# Patient Record
Sex: MALE | Race: ASIAN | NOT HISPANIC OR LATINO | URBAN - METROPOLITAN AREA
[De-identification: names, ages, dates, MRNs, and addresses within clinical notes are randomized per-mention and may not be internally consistent; named-entity substitution may affect disease eponyms.]

---

## 2023-05-07 ENCOUNTER — INPATIENT (INPATIENT)
Facility: HOSPITAL | Age: 43
LOS: 4 days | Discharge: LEFT BEFORE DISCHARGE | DRG: 439 | End: 2023-05-12
Attending: INTERNAL MEDICINE | Admitting: INTERNAL MEDICINE
Payer: MEDICAID

## 2023-05-07 VITALS
OXYGEN SATURATION: 100 % | HEART RATE: 95 BPM | RESPIRATION RATE: 16 BRPM | TEMPERATURE: 98 F | SYSTOLIC BLOOD PRESSURE: 71 MMHG | DIASTOLIC BLOOD PRESSURE: 46 MMHG | WEIGHT: 154.1 LBS | HEIGHT: 72 IN

## 2023-05-07 DIAGNOSIS — N17.9 ACUTE KIDNEY FAILURE, UNSPECIFIED: ICD-10-CM

## 2023-05-07 DIAGNOSIS — Z29.9 ENCOUNTER FOR PROPHYLACTIC MEASURES, UNSPECIFIED: ICD-10-CM

## 2023-05-07 DIAGNOSIS — E87.29 OTHER ACIDOSIS: ICD-10-CM

## 2023-05-07 DIAGNOSIS — F10.10 ALCOHOL ABUSE, UNCOMPLICATED: ICD-10-CM

## 2023-05-07 DIAGNOSIS — K85.20 ALCOHOL INDUCED ACUTE PANCREATITIS WITHOUT NECROSIS OR INFECTION: ICD-10-CM

## 2023-05-07 DIAGNOSIS — E88.89 OTHER SPECIFIED METABOLIC DISORDERS: ICD-10-CM

## 2023-05-07 LAB
ALBUMIN SERPL ELPH-MCNC: 3.4 G/DL — LOW (ref 3.5–5)
ALBUMIN SERPL ELPH-MCNC: 3.5 G/DL — SIGNIFICANT CHANGE UP (ref 3.5–5)
ALBUMIN SERPL ELPH-MCNC: 4.4 G/DL — SIGNIFICANT CHANGE UP (ref 3.5–5)
ALP SERPL-CCNC: 77 U/L — SIGNIFICANT CHANGE UP (ref 40–120)
ALP SERPL-CCNC: 79 U/L — SIGNIFICANT CHANGE UP (ref 40–120)
ALP SERPL-CCNC: 98 U/L — SIGNIFICANT CHANGE UP (ref 40–120)
ALT FLD-CCNC: 47 U/L DA — SIGNIFICANT CHANGE UP (ref 10–60)
ALT FLD-CCNC: 47 U/L DA — SIGNIFICANT CHANGE UP (ref 10–60)
ALT FLD-CCNC: 59 U/L DA — SIGNIFICANT CHANGE UP (ref 10–60)
AMPHET UR-MCNC: NEGATIVE — SIGNIFICANT CHANGE UP
ANION GAP SERPL CALC-SCNC: 18 MMOL/L — HIGH (ref 5–17)
ANION GAP SERPL CALC-SCNC: 27 MMOL/L — HIGH (ref 5–17)
ANION GAP SERPL CALC-SCNC: 30 MMOL/L — HIGH (ref 5–17)
APPEARANCE UR: ABNORMAL
AST SERPL-CCNC: 73 U/L — HIGH (ref 10–40)
AST SERPL-CCNC: 78 U/L — HIGH (ref 10–40)
AST SERPL-CCNC: 90 U/L — HIGH (ref 10–40)
BACTERIA # UR AUTO: ABNORMAL /HPF
BARBITURATES UR SCN-MCNC: NEGATIVE — SIGNIFICANT CHANGE UP
BASE EXCESS BLDV CALC-SCNC: SIGNIFICANT CHANGE UP MMOL/L
BASOPHILS # BLD AUTO: 0.01 K/UL — SIGNIFICANT CHANGE UP (ref 0–0.2)
BASOPHILS # BLD AUTO: 0.02 K/UL — SIGNIFICANT CHANGE UP (ref 0–0.2)
BASOPHILS NFR BLD AUTO: 0.1 % — SIGNIFICANT CHANGE UP (ref 0–2)
BASOPHILS NFR BLD AUTO: 0.2 % — SIGNIFICANT CHANGE UP (ref 0–2)
BENZODIAZ UR-MCNC: NEGATIVE — SIGNIFICANT CHANGE UP
BILIRUB SERPL-MCNC: 0.6 MG/DL — SIGNIFICANT CHANGE UP (ref 0.2–1.2)
BILIRUB SERPL-MCNC: 0.7 MG/DL — SIGNIFICANT CHANGE UP (ref 0.2–1.2)
BILIRUB SERPL-MCNC: 1 MG/DL — SIGNIFICANT CHANGE UP (ref 0.2–1.2)
BILIRUB UR-MCNC: NEGATIVE — SIGNIFICANT CHANGE UP
BUN SERPL-MCNC: 21 MG/DL — HIGH (ref 7–18)
BUN SERPL-MCNC: 24 MG/DL — HIGH (ref 7–18)
BUN SERPL-MCNC: 24 MG/DL — HIGH (ref 7–18)
CALCIUM SERPL-MCNC: 7.8 MG/DL — LOW (ref 8.4–10.5)
CALCIUM SERPL-MCNC: 8.1 MG/DL — LOW (ref 8.4–10.5)
CALCIUM SERPL-MCNC: 8.5 MG/DL — SIGNIFICANT CHANGE UP (ref 8.4–10.5)
CHLORIDE SERPL-SCNC: 101 MMOL/L — SIGNIFICANT CHANGE UP (ref 96–108)
CHLORIDE SERPL-SCNC: 91 MMOL/L — LOW (ref 96–108)
CHLORIDE SERPL-SCNC: 97 MMOL/L — SIGNIFICANT CHANGE UP (ref 96–108)
CK MB BLD-MCNC: 3.2 % — SIGNIFICANT CHANGE UP (ref 0–3.5)
CK MB CFR SERPL CALC: 3.8 NG/ML — HIGH (ref 0–3.6)
CK SERPL-CCNC: 120 U/L — SIGNIFICANT CHANGE UP (ref 35–232)
CO2 SERPL-SCNC: 14 MMOL/L — LOW (ref 22–31)
CO2 SERPL-SCNC: 9 MMOL/L — CRITICAL LOW (ref 22–31)
CO2 SERPL-SCNC: 9 MMOL/L — CRITICAL LOW (ref 22–31)
COCAINE METAB.OTHER UR-MCNC: NEGATIVE — SIGNIFICANT CHANGE UP
COLOR SPEC: YELLOW — SIGNIFICANT CHANGE UP
CREAT ?TM UR-MCNC: 62 MG/DL — SIGNIFICANT CHANGE UP
CREAT SERPL-MCNC: 1.47 MG/DL — HIGH (ref 0.5–1.3)
CREAT SERPL-MCNC: 1.94 MG/DL — HIGH (ref 0.5–1.3)
CREAT SERPL-MCNC: 2.85 MG/DL — HIGH (ref 0.5–1.3)
DIFF PNL FLD: ABNORMAL
EGFR: 27 ML/MIN/1.73M2 — LOW
EGFR: 44 ML/MIN/1.73M2 — LOW
EGFR: 61 ML/MIN/1.73M2 — SIGNIFICANT CHANGE UP
EOSINOPHIL # BLD AUTO: 0 K/UL — SIGNIFICANT CHANGE UP (ref 0–0.5)
EOSINOPHIL # BLD AUTO: 0 K/UL — SIGNIFICANT CHANGE UP (ref 0–0.5)
EOSINOPHIL NFR BLD AUTO: 0 % — SIGNIFICANT CHANGE UP (ref 0–6)
EOSINOPHIL NFR BLD AUTO: 0 % — SIGNIFICANT CHANGE UP (ref 0–6)
EPI CELLS # UR: SIGNIFICANT CHANGE UP /HPF
ETHANOL SERPL-MCNC: 229 MG/DL — HIGH (ref 0–10)
GAS PNL BLDA: SIGNIFICANT CHANGE UP
GAS PNL BLDA: SIGNIFICANT CHANGE UP
GLUCOSE BLDC GLUCOMTR-MCNC: 75 MG/DL — SIGNIFICANT CHANGE UP (ref 70–99)
GLUCOSE SERPL-MCNC: 109 MG/DL — HIGH (ref 70–99)
GLUCOSE SERPL-MCNC: 113 MG/DL — HIGH (ref 70–99)
GLUCOSE SERPL-MCNC: 75 MG/DL — SIGNIFICANT CHANGE UP (ref 70–99)
GLUCOSE UR QL: NEGATIVE — SIGNIFICANT CHANGE UP
HCO3 BLDV-SCNC: SIGNIFICANT CHANGE UP MMOL/L (ref 22–29)
HCT VFR BLD CALC: 36.7 % — LOW (ref 39–50)
HCT VFR BLD CALC: 42.8 % — SIGNIFICANT CHANGE UP (ref 39–50)
HGB BLD-MCNC: 12 G/DL — LOW (ref 13–17)
HGB BLD-MCNC: 13.9 G/DL — SIGNIFICANT CHANGE UP (ref 13–17)
IMM GRANULOCYTES NFR BLD AUTO: 0.5 % — SIGNIFICANT CHANGE UP (ref 0–0.9)
IMM GRANULOCYTES NFR BLD AUTO: 0.6 % — SIGNIFICANT CHANGE UP (ref 0–0.9)
KETONES UR-MCNC: ABNORMAL
LACTATE SERPL-SCNC: 4.7 MMOL/L — CRITICAL HIGH (ref 0.7–2)
LACTATE SERPL-SCNC: 5.3 MMOL/L — CRITICAL HIGH (ref 0.7–2)
LACTATE SERPL-SCNC: 7.3 MMOL/L — CRITICAL HIGH (ref 0.7–2)
LEUKOCYTE ESTERASE UR-ACNC: NEGATIVE — SIGNIFICANT CHANGE UP
LIDOCAIN IGE QN: HIGH U/L (ref 73–393)
LYMPHOCYTES # BLD AUTO: 0.26 K/UL — LOW (ref 1–3.3)
LYMPHOCYTES # BLD AUTO: 0.36 K/UL — LOW (ref 1–3.3)
LYMPHOCYTES # BLD AUTO: 2.1 % — LOW (ref 13–44)
LYMPHOCYTES # BLD AUTO: 3 % — LOW (ref 13–44)
MAGNESIUM SERPL-MCNC: 1.6 MG/DL — SIGNIFICANT CHANGE UP (ref 1.6–2.6)
MCHC RBC-ENTMCNC: 28.9 PG — SIGNIFICANT CHANGE UP (ref 27–34)
MCHC RBC-ENTMCNC: 29.1 PG — SIGNIFICANT CHANGE UP (ref 27–34)
MCHC RBC-ENTMCNC: 32.5 GM/DL — SIGNIFICANT CHANGE UP (ref 32–36)
MCHC RBC-ENTMCNC: 32.7 GM/DL — SIGNIFICANT CHANGE UP (ref 32–36)
MCV RBC AUTO: 88.4 FL — SIGNIFICANT CHANGE UP (ref 80–100)
MCV RBC AUTO: 89.7 FL — SIGNIFICANT CHANGE UP (ref 80–100)
METHADONE UR-MCNC: NEGATIVE — SIGNIFICANT CHANGE UP
MONOCYTES # BLD AUTO: 0.53 K/UL — SIGNIFICANT CHANGE UP (ref 0–0.9)
MONOCYTES # BLD AUTO: 0.98 K/UL — HIGH (ref 0–0.9)
MONOCYTES NFR BLD AUTO: 4.3 % — SIGNIFICANT CHANGE UP (ref 2–14)
MONOCYTES NFR BLD AUTO: 8.1 % — SIGNIFICANT CHANGE UP (ref 2–14)
NEUTROPHILS # BLD AUTO: 10.65 K/UL — HIGH (ref 1.8–7.4)
NEUTROPHILS # BLD AUTO: 11.38 K/UL — HIGH (ref 1.8–7.4)
NEUTROPHILS NFR BLD AUTO: 88.1 % — HIGH (ref 43–77)
NEUTROPHILS NFR BLD AUTO: 93 % — HIGH (ref 43–77)
NITRITE UR-MCNC: NEGATIVE — SIGNIFICANT CHANGE UP
NRBC # BLD: 0 /100 WBCS — SIGNIFICANT CHANGE UP (ref 0–0)
NRBC # BLD: 0 /100 WBCS — SIGNIFICANT CHANGE UP (ref 0–0)
OPIATES UR-MCNC: NEGATIVE — SIGNIFICANT CHANGE UP
OSMOLALITY SERPL: 326 MOSMOL/KG — HIGH (ref 275–300)
PCO2 BLDV: 30 MMHG — LOW (ref 42–55)
PCP SPEC-MCNC: SIGNIFICANT CHANGE UP
PCP SPEC-MCNC: SIGNIFICANT CHANGE UP
PCP UR-MCNC: NEGATIVE — SIGNIFICANT CHANGE UP
PH BLDV: <7 — SIGNIFICANT CHANGE UP (ref 7.32–7.43)
PH UR: 6 — SIGNIFICANT CHANGE UP (ref 5–8)
PHOSPHATE SERPL-MCNC: 1.2 MG/DL — LOW (ref 2.5–4.5)
PLATELET # BLD AUTO: 133 K/UL — LOW (ref 150–400)
PLATELET # BLD AUTO: 89 K/UL — LOW (ref 150–400)
PO2 BLDV: 48 MMHG — SIGNIFICANT CHANGE UP
POTASSIUM SERPL-MCNC: 4 MMOL/L — SIGNIFICANT CHANGE UP (ref 3.5–5.3)
POTASSIUM SERPL-MCNC: 4.4 MMOL/L — SIGNIFICANT CHANGE UP (ref 3.5–5.3)
POTASSIUM SERPL-MCNC: 4.5 MMOL/L — SIGNIFICANT CHANGE UP (ref 3.5–5.3)
POTASSIUM SERPL-SCNC: 4 MMOL/L — SIGNIFICANT CHANGE UP (ref 3.5–5.3)
POTASSIUM SERPL-SCNC: 4.4 MMOL/L — SIGNIFICANT CHANGE UP (ref 3.5–5.3)
POTASSIUM SERPL-SCNC: 4.5 MMOL/L — SIGNIFICANT CHANGE UP (ref 3.5–5.3)
PROT SERPL-MCNC: 6.4 G/DL — SIGNIFICANT CHANGE UP (ref 6–8.3)
PROT SERPL-MCNC: 6.4 G/DL — SIGNIFICANT CHANGE UP (ref 6–8.3)
PROT SERPL-MCNC: 8 G/DL — SIGNIFICANT CHANGE UP (ref 6–8.3)
PROT UR-MCNC: 30 MG/DL
RBC # BLD: 4.15 M/UL — LOW (ref 4.2–5.8)
RBC # BLD: 4.77 M/UL — SIGNIFICANT CHANGE UP (ref 4.2–5.8)
RBC # FLD: 17.8 % — HIGH (ref 10.3–14.5)
RBC # FLD: 18 % — HIGH (ref 10.3–14.5)
RBC CASTS # UR COMP ASSIST: ABNORMAL /HPF (ref 0–2)
SAO2 % BLDV: 74.5 % — SIGNIFICANT CHANGE UP
SODIUM SERPL-SCNC: 130 MMOL/L — LOW (ref 135–145)
SODIUM SERPL-SCNC: 133 MMOL/L — LOW (ref 135–145)
SODIUM SERPL-SCNC: 133 MMOL/L — LOW (ref 135–145)
SODIUM UR-SCNC: 45 MMOL/L — SIGNIFICANT CHANGE UP
SP GR SPEC: 1.02 — SIGNIFICANT CHANGE UP (ref 1.01–1.02)
THC UR QL: NEGATIVE — SIGNIFICANT CHANGE UP
TRIGL SERPL-MCNC: 224 MG/DL — HIGH
TROPONIN I, HIGH SENSITIVITY RESULT: 8.6 NG/L — SIGNIFICANT CHANGE UP
UROBILINOGEN FLD QL: NEGATIVE — SIGNIFICANT CHANGE UP
WBC # BLD: 12.24 K/UL — HIGH (ref 3.8–10.5)
WBC # BLD: 12.24 K/UL — HIGH (ref 3.8–10.5)
WBC # FLD AUTO: 12.24 K/UL — HIGH (ref 3.8–10.5)
WBC # FLD AUTO: 12.24 K/UL — HIGH (ref 3.8–10.5)
WBC UR QL: SIGNIFICANT CHANGE UP /HPF (ref 0–5)

## 2023-05-07 PROCEDURE — 74176 CT ABD & PELVIS W/O CONTRAST: CPT | Mod: 26

## 2023-05-07 PROCEDURE — 71045 X-RAY EXAM CHEST 1 VIEW: CPT | Mod: 26

## 2023-05-07 PROCEDURE — 99291 CRITICAL CARE FIRST HOUR: CPT

## 2023-05-07 RX ORDER — THIAMINE MONONITRATE (VIT B1) 100 MG
500 TABLET ORAL DAILY
Refills: 0 | Status: COMPLETED | OUTPATIENT
Start: 2023-05-07 | End: 2023-05-10

## 2023-05-07 RX ORDER — FENTANYL CITRATE 50 UG/ML
50 INJECTION INTRAVENOUS ONCE
Refills: 0 | Status: DISCONTINUED | OUTPATIENT
Start: 2023-05-07 | End: 2023-05-07

## 2023-05-07 RX ORDER — FAMOTIDINE 10 MG/ML
20 INJECTION INTRAVENOUS ONCE
Refills: 0 | Status: COMPLETED | OUTPATIENT
Start: 2023-05-07 | End: 2023-05-07

## 2023-05-07 RX ORDER — THIAMINE MONONITRATE (VIT B1) 100 MG
500 TABLET ORAL ONCE
Refills: 0 | Status: COMPLETED | OUTPATIENT
Start: 2023-05-07 | End: 2023-05-07

## 2023-05-07 RX ORDER — SODIUM CHLORIDE 9 MG/ML
1000 INJECTION, SOLUTION INTRAVENOUS ONCE
Refills: 0 | Status: COMPLETED | OUTPATIENT
Start: 2023-05-07 | End: 2023-05-07

## 2023-05-07 RX ORDER — SODIUM CHLORIDE 9 MG/ML
1000 INJECTION, SOLUTION INTRAVENOUS
Refills: 0 | Status: DISCONTINUED | OUTPATIENT
Start: 2023-05-07 | End: 2023-05-07

## 2023-05-07 RX ORDER — SODIUM BICARBONATE 1 MEQ/ML
0.05 SYRINGE (ML) INTRAVENOUS
Qty: 75 | Refills: 0 | Status: DISCONTINUED | OUTPATIENT
Start: 2023-05-07 | End: 2023-05-07

## 2023-05-07 RX ORDER — POTASSIUM PHOSPHATE, MONOBASIC POTASSIUM PHOSPHATE, DIBASIC 236; 224 MG/ML; MG/ML
30 INJECTION, SOLUTION INTRAVENOUS ONCE
Refills: 0 | Status: COMPLETED | OUTPATIENT
Start: 2023-05-07 | End: 2023-05-07

## 2023-05-07 RX ORDER — ACETAMINOPHEN 500 MG
650 TABLET ORAL EVERY 6 HOURS
Refills: 0 | Status: DISCONTINUED | OUTPATIENT
Start: 2023-05-07 | End: 2023-05-12

## 2023-05-07 RX ORDER — TRAMADOL HYDROCHLORIDE 50 MG/1
25 TABLET ORAL EVERY 6 HOURS
Refills: 0 | Status: DISCONTINUED | OUTPATIENT
Start: 2023-05-07 | End: 2023-05-12

## 2023-05-07 RX ORDER — HEPARIN SODIUM 5000 [USP'U]/ML
5000 INJECTION INTRAVENOUS; SUBCUTANEOUS EVERY 8 HOURS
Refills: 0 | Status: DISCONTINUED | OUTPATIENT
Start: 2023-05-07 | End: 2023-05-09

## 2023-05-07 RX ORDER — SODIUM CHLORIDE 9 MG/ML
1000 INJECTION INTRAMUSCULAR; INTRAVENOUS; SUBCUTANEOUS ONCE
Refills: 0 | Status: COMPLETED | OUTPATIENT
Start: 2023-05-07 | End: 2023-05-07

## 2023-05-07 RX ORDER — METOPROLOL TARTRATE 50 MG
5 TABLET ORAL ONCE
Refills: 0 | Status: DISCONTINUED | OUTPATIENT
Start: 2023-05-07 | End: 2023-05-07

## 2023-05-07 RX ORDER — SODIUM CHLORIDE 9 MG/ML
1000 INJECTION, SOLUTION INTRAVENOUS
Refills: 0 | Status: DISCONTINUED | OUTPATIENT
Start: 2023-05-07 | End: 2023-05-08

## 2023-05-07 RX ORDER — PANTOPRAZOLE SODIUM 20 MG/1
80 TABLET, DELAYED RELEASE ORAL ONCE
Refills: 0 | Status: COMPLETED | OUTPATIENT
Start: 2023-05-07 | End: 2023-05-07

## 2023-05-07 RX ORDER — SODIUM BICARBONATE 1 MEQ/ML
100 SYRINGE (ML) INTRAVENOUS ONCE
Refills: 0 | Status: COMPLETED | OUTPATIENT
Start: 2023-05-07 | End: 2023-05-07

## 2023-05-07 RX ORDER — DEXTROSE 50 % IN WATER 50 %
50 SYRINGE (ML) INTRAVENOUS ONCE
Refills: 0 | Status: COMPLETED | OUTPATIENT
Start: 2023-05-07 | End: 2023-05-07

## 2023-05-07 RX ORDER — SODIUM BICARBONATE 1 MEQ/ML
0.13 SYRINGE (ML) INTRAVENOUS
Qty: 150 | Refills: 0 | Status: DISCONTINUED | OUTPATIENT
Start: 2023-05-07 | End: 2023-05-07

## 2023-05-07 RX ORDER — FOLIC ACID 0.8 MG
1 TABLET ORAL ONCE
Refills: 0 | Status: COMPLETED | OUTPATIENT
Start: 2023-05-07 | End: 2023-05-07

## 2023-05-07 RX ORDER — ONDANSETRON 8 MG/1
4 TABLET, FILM COATED ORAL ONCE
Refills: 0 | Status: COMPLETED | OUTPATIENT
Start: 2023-05-07 | End: 2023-05-07

## 2023-05-07 RX ORDER — PANTOPRAZOLE SODIUM 20 MG/1
40 TABLET, DELAYED RELEASE ORAL DAILY
Refills: 0 | Status: DISCONTINUED | OUTPATIENT
Start: 2023-05-07 | End: 2023-05-09

## 2023-05-07 RX ORDER — FOLIC ACID 0.8 MG
1 TABLET ORAL DAILY
Refills: 0 | Status: DISCONTINUED | OUTPATIENT
Start: 2023-05-07 | End: 2023-05-12

## 2023-05-07 RX ORDER — HYDROMORPHONE HYDROCHLORIDE 2 MG/ML
1 INJECTION INTRAMUSCULAR; INTRAVENOUS; SUBCUTANEOUS EVERY 6 HOURS
Refills: 0 | Status: DISCONTINUED | OUTPATIENT
Start: 2023-05-07 | End: 2023-05-09

## 2023-05-07 RX ORDER — MAGNESIUM SULFATE 500 MG/ML
1 VIAL (ML) INJECTION ONCE
Refills: 0 | Status: COMPLETED | OUTPATIENT
Start: 2023-05-07 | End: 2023-05-07

## 2023-05-07 RX ADMIN — Medication 60 MEQ/KG/HR: at 17:31

## 2023-05-07 RX ADMIN — FENTANYL CITRATE 50 MICROGRAM(S): 50 INJECTION INTRAVENOUS at 12:47

## 2023-05-07 RX ADMIN — SODIUM CHLORIDE 1000 MILLILITER(S): 9 INJECTION, SOLUTION INTRAVENOUS at 12:29

## 2023-05-07 RX ADMIN — Medication 1 GRAM(S): at 12:25

## 2023-05-07 RX ADMIN — SODIUM CHLORIDE 150 MILLILITER(S): 9 INJECTION, SOLUTION INTRAVENOUS at 23:51

## 2023-05-07 RX ADMIN — FENTANYL CITRATE 50 MICROGRAM(S): 50 INJECTION INTRAVENOUS at 13:17

## 2023-05-07 RX ADMIN — Medication 50 MEQ/KG/HR: at 14:16

## 2023-05-07 RX ADMIN — Medication 500 MILLIGRAM(S): at 13:46

## 2023-05-07 RX ADMIN — SODIUM CHLORIDE 120 MILLILITER(S): 9 INJECTION, SOLUTION INTRAVENOUS at 13:06

## 2023-05-07 RX ADMIN — Medication 100 GRAM(S): at 11:25

## 2023-05-07 RX ADMIN — Medication 105 MILLIGRAM(S): at 12:46

## 2023-05-07 RX ADMIN — TRAMADOL HYDROCHLORIDE 25 MILLIGRAM(S): 50 TABLET ORAL at 20:23

## 2023-05-07 RX ADMIN — SODIUM CHLORIDE 1000 MILLILITER(S): 9 INJECTION, SOLUTION INTRAVENOUS at 19:05

## 2023-05-07 RX ADMIN — Medication 100 MILLIEQUIVALENT(S): at 13:00

## 2023-05-07 RX ADMIN — HEPARIN SODIUM 5000 UNIT(S): 5000 INJECTION INTRAVENOUS; SUBCUTANEOUS at 21:38

## 2023-05-07 RX ADMIN — PANTOPRAZOLE SODIUM 40 MILLIGRAM(S): 20 TABLET, DELAYED RELEASE ORAL at 20:22

## 2023-05-07 RX ADMIN — TRAMADOL HYDROCHLORIDE 25 MILLIGRAM(S): 50 TABLET ORAL at 21:15

## 2023-05-07 RX ADMIN — Medication 1 TABLET(S): at 11:23

## 2023-05-07 RX ADMIN — Medication 50 MILLILITER(S): at 23:51

## 2023-05-07 RX ADMIN — HYDROMORPHONE HYDROCHLORIDE 1 MILLIGRAM(S): 2 INJECTION INTRAMUSCULAR; INTRAVENOUS; SUBCUTANEOUS at 17:54

## 2023-05-07 RX ADMIN — ONDANSETRON 4 MILLIGRAM(S): 8 TABLET, FILM COATED ORAL at 11:25

## 2023-05-07 RX ADMIN — POTASSIUM PHOSPHATE, MONOBASIC POTASSIUM PHOSPHATE, DIBASIC 83.33 MILLIMOLE(S): 236; 224 INJECTION, SOLUTION INTRAVENOUS at 21:36

## 2023-05-07 RX ADMIN — HYDROMORPHONE HYDROCHLORIDE 1 MILLIGRAM(S): 2 INJECTION INTRAMUSCULAR; INTRAVENOUS; SUBCUTANEOUS at 16:41

## 2023-05-07 RX ADMIN — Medication 2 MILLIGRAM(S): at 21:38

## 2023-05-07 RX ADMIN — FAMOTIDINE 20 MILLIGRAM(S): 10 INJECTION INTRAVENOUS at 11:23

## 2023-05-07 RX ADMIN — SODIUM CHLORIDE 1000 MILLILITER(S): 9 INJECTION INTRAMUSCULAR; INTRAVENOUS; SUBCUTANEOUS at 12:27

## 2023-05-07 RX ADMIN — PANTOPRAZOLE SODIUM 80 MILLIGRAM(S): 20 TABLET, DELAYED RELEASE ORAL at 11:30

## 2023-05-07 RX ADMIN — SODIUM CHLORIDE 1000 MILLILITER(S): 9 INJECTION INTRAMUSCULAR; INTRAVENOUS; SUBCUTANEOUS at 11:27

## 2023-05-07 RX ADMIN — Medication 1 MILLIGRAM(S): at 11:23

## 2023-05-07 RX ADMIN — SODIUM CHLORIDE 1000 MILLILITER(S): 9 INJECTION, SOLUTION INTRAVENOUS at 11:29

## 2023-05-07 RX ADMIN — SODIUM CHLORIDE 150 MILLILITER(S): 9 INJECTION, SOLUTION INTRAVENOUS at 20:23

## 2023-05-07 NOTE — ED PROVIDER NOTE - CLINICAL SUMMARY MEDICAL DECISION MAKING FREE TEXT BOX
43 yr old male with hx of alcohol abuse presents to ed c/o nv nbnb x 3 days with fatigue and epigastric pain. no trauma, no syncope, no diarrhea, no drugs. pt last vodka intake last night about 250ml.    alcohol abuse, no sign of withdrawal at this state but concerning for severe malabsorption vs alcohol intox vs dehydration - labs, fluids, meds, ekg, re-assess

## 2023-05-07 NOTE — ED PROVIDER NOTE - CRITICAL CARE ATTENDING CONTRIBUTION TO CARE
Lawrence: Due to the presence of hypotension and the risk of sudden rapid deterioration due to my attendance to this patient required critical care time of approx 60 minutes including assessment/reassessment, documentation, ordering and interpreting ancillary studies, discussion with ED staff and consultants, patient and their family, and excludes time spent in teaching of Residents and time spent on separately billable procedures.

## 2023-05-07 NOTE — H&P ADULT - ASSESSMENT
43M with PMHx of EtOH abuse p/w abdominal pain, nausea and vomiting. Admitted for alcoholic pancreatitis and ketoacidosis.

## 2023-05-07 NOTE — H&P ADULT - ATTENDING COMMENTS
43 year old male with PMHx EtOH abuse and tobacco dependence presents with abdominal pain. History obtained from cousin at bedside, states that patient has a prolonged history of EtOH abuse and has been sober for 25 days before relapsing recently after getting money from a new job. States that patient has had nonstop nausea and vomiting over the past 3 days and has not eaten in over a week. Patient reportedly drinks 250ml vodka at least once a day, last drink was yesterday evening. Patient and cousin deny use of any other substances aside from tobacco use.       assessment  --  alcoholic keto acidosis, alcoholic pancreatitis, alcohol abuse, edvin, h/o EtOH abuse and tobacco dependence    plan  --  admit to med , bicarb drip, librium as hosp protocol, ativan as per ciwa protocol, folic acid, thiamine, multivitamin, cont preadmit home meds, gi and dvt prophylaxis     cbc, bmp, mg, phos, lipids, tsh, bld cx, ua, ucx,        icu cons   soc work cons

## 2023-05-07 NOTE — H&P ADULT - PROBLEM SELECTOR PLAN 1
pt p/w n/v abdominal pain   VS as above     Bicarb 9  ABG <7  s/p 2 amps bicarb and 2L in ED    started on bicarb drip  started on CIWA protocol

## 2023-05-07 NOTE — PATIENT PROFILE ADULT - STATED REASON FOR ADMISSION
Burning abdominal pain after drinking Vodka. Has been drinking for about 20 years and stopped for a few days than started drinking again.

## 2023-05-07 NOTE — PATIENT PROFILE ADULT - FUNCTIONAL SCREEN CURRENT LEVEL: SWALLOWING (IF SCORE 2 OR MORE FOR ANY ITEM, CONSULT REHAB SERVICES), MLM)
Has eaten anything significant in 2 weeks. Has just been drinking./0 = swallows foods/liquids without difficulty

## 2023-05-07 NOTE — H&P ADULT - PROBLEM SELECTOR PLAN 3
Cr 2.85  pt without hx of CKD   likely in setting of hypovolemia/ severe acidosis  c/w IVF as above  Avoid nephrotoxins

## 2023-05-07 NOTE — PATIENT PROFILE ADULT - NSPROGENPREVTRANSF_GEN_A_NUR
Devante Dent-    I am sorry to hear that you are having ongoing knee pain.  I recommend conservative therapy, ice, elevation, and to continue the PHYSICAL THERAPY program with [facility].  I also see that Dr. Levine (non-surgical ortho) had wanted to see you in follow-up re: your knee pain in 2-3 weeks. I recommend that you also follow-up with Dr. Levine as per his recommendations.       You could also trial Aspercreme with 4% lidocaine cream; this is found over-the-counter and may be used according to package instructions for topical pain relief of your knee pain.    Thanks.  Susana GRANT RN CNP, JAQUELINP  [facility]    I sent the above Gratcihart message to the patient.    Susana GRANT RN CNP, JAQUELINP  [facility]     no history of blood product transfusion

## 2023-05-07 NOTE — PATIENT PROFILE ADULT - FALL HARM RISK - HARM RISK INTERVENTIONS
Assistance with ambulation/Assistance OOB with selected safe patient handling equipment/Communicate Risk of Fall with Harm to all staff/Discuss with provider need for PT consult/Monitor for mental status changes/Monitor gait and stability/Provide patient with walking aids - walker, cane, crutches/Reinforce activity limits and safety measures with patient and family/Tailored Fall Risk Interventions/Toileting schedule using arm’s reach rule for commode and bathroom/Use of alarms - bed, chair and/or voice tab/Visual Cue: Yellow wristband and red socks/Bed in lowest position, wheels locked, appropriate side rails in place/Call bell, personal items and telephone in reach/Instruct patient to call for assistance before getting out of bed or chair/Non-slip footwear when patient is out of bed/[place] to call system/Physically safe environment - no spills, clutter or unnecessary equipment/Purposeful Proactive Rounding/Room/bathroom lighting operational, light cord in reach

## 2023-05-07 NOTE — ED PROVIDER NOTE - OBJECTIVE STATEMENT
43 yr old male with hx of alcohol abuse presents to ed c/o nv nbnb x 3 days with fatigue and epigastric pain. no trauma, no syncope, no diarrhea, no drugs. pt last vodka intake last night about 250ml.

## 2023-05-07 NOTE — CONSULT NOTE ADULT - ASSESSMENT
Patient is a 44 yo with no prior medical history, hx of Alcohol abuse and tobacco dependence, p/w abdominal pain and vomiting, patient admitted for Alcoholic ketoacidosis and pancreatitis. ICU consulted for close hemodynamic monitoring givens severe lactic acidosis with HAGMA.    ASSESSMENT and PLAN  #Acute pancreatitis  #Severe lactic acidosis with HAGMA  #Acute renal failure  #Alcohol use disorder    Neuro:    Cardiovascular:    Pulmonary:     Infectious Diseases:    Gastrointestinal:    Renal:    Endo:    Heme/onc:     Skin/ catheter:     Prophylaxis:     Goals of Care:     Dispo:   Patient is a 42 yo with no prior medical history, hx of Alcohol abuse and tobacco dependence, p/w abdominal pain and vomiting, patient admitted for Alcoholic ketoacidosis and pancreatitis. ICU consulted for close hemodynamic monitoring givens severe lactic acidosis with HAGMA.    ASSESSMENT and PLAN  #Acute pancreatitis  #Severe lactic acidosis with HAGMA  #Acute renal failure  #Alcohol use disorder    Neuro:  AAO x 3, no acute issues    Cardiovascular:  No acute issues    Pulmonary:   No acute issues    Infectious Diseases:  No acute issues    Gastrointestinal:  #Acute pancreatitis  with lipase 14k, triglycerides not markedly elevated 224, epigastric tenderness  Alcohol induced pancreatitis, on symptom triggered ativan   c/w CIWA protocol, high dose thiamine, folic acid, multivitamin    Renal:  #Severe lactic acidosis with HAGMA      #SRINIVAS  likely due to dehydration  improving s/p fluids    Endo:  No acute issues    Heme/onc:   #Leukocytosis  12k, no fevers, given lactic acidosis will evaluate for sepsis  f/u blood cs, UA clear, xray clear, no abx unless patient spikes temp    Skin/ catheter: peripheral lines    Prophylaxis: heparin for dvt ppx   Patient is a 42 yo with no prior medical history, hx of Alcohol abuse and tobacco dependence, p/w abdominal pain and vomiting, patient admitted for Alcoholic ketoacidosis and pancreatitis. ICU consulted for close hemodynamic monitoring givens severe lactic acidosis with HAGMA.    ASSESSMENT and PLAN  #Acute pancreatitis  #Severe lactic acidosis with HAGMA  #Acute renal failure  #Alcohol use disorder  #Thrombocytopenia    Neuro:  AAO x 3, no acute issues    Cardiovascular:  No acute issues    Pulmonary:   No acute issues    Infectious Diseases:  No acute issues    Gastrointestinal:  #Acute pancreatitis  with lipase 14k, triglycerides not markedly elevated 224, epigastric tenderness  Alcohol induced pancreatitis, on symptom triggered ativan   c/w CIWA protocol, high dose thiamine, folic acid, multivitamin    Renal:  #Severe lactic acidosis with HAGMA  Noted with alcoholic ketoacidosis,   cardiac enzymes negative, EKG with NSR,  msec      #SRINIVAS  likely due to dehydration  improving s/p fluids    Endo:  No acute issues    Heme/onc:   #Leukocytosis  12k, no fevers, given lactic acidosis will evaluate for sepsis  f/u blood cs, UA clear, xray clear, no abx unless patient spikes temp    #Thrombocytopenia  likely d/t alcohol use  monitor for now    Skin/ catheter: peripheral lines    Prophylaxis: heparin for dvt ppx   Patient is a 44 yo with no prior medical history, hx of Alcohol abuse and tobacco dependence, p/w abdominal pain and vomiting, patient admitted for Alcoholic ketoacidosis and pancreatitis. ICU consulted for close hemodynamic monitoring givens severe lactic acidosis with HAGMA.    ASSESSMENT and PLAN  #Acute pancreatitis  #Severe lactic acidosis with HAGMA  #Acute renal failure  #Alcohol use disorder  #Thrombocytopenia    Neuro:  AAO x 3, no acute issues    Cardiovascular:  No acute issues    Pulmonary:   No acute issues    Infectious Diseases:  No acute issues    Gastrointestinal:  #Acute pancreatitis  with lipase 14k, triglycerides not markedly elevated 224, epigastric tenderness  Alcohol induced pancreatitis, on symptom triggered ativan   c/w CIWA protocol, high dose thiamine, folic acid, multivitamin    Renal:  #Severe lactic acidosis with HAGMA  Noted with alcoholic ketoacidosis,   cardiac enzymes negative, EKG with NSR,  msec  trend lactate, s/p 2L bolus  obtain serum osm, calculate osmolar gap    #SRINIVAS  likely due to dehydration  improving s/p fluids    Endo:  No acute issues    Heme/onc:   #Leukocytosis  12k, no fevers, given lactic acidosis will evaluate for sepsis  f/u blood cs, UA clear, xray clear, no abx unless patient spikes temp    #Thrombocytopenia  likely d/t alcohol use  monitor for now    Skin/ catheter: peripheral lines    Prophylaxis: heparin for dvt ppx

## 2023-05-07 NOTE — H&P ADULT - NSHPPHYSICALEXAM_GEN_ALL_CORE
T(C): 36.7 (05-07-23 @ 10:43), Max: 36.7 (05-07-23 @ 10:43)  HR: 100 (05-07-23 @ 11:14) (95 - 100)  BP: 92/67 (05-07-23 @ 11:14) (71/46 - 92/67)  RR: 20 (05-07-23 @ 11:14) (16 - 20)  SpO2: 100% (05-07-23 @ 11:14) (100% - 100%)    GENERAL: tremulous patient, NAD  HEAD: normocephalic, atraumatic  EYES: sclera clear, no exudates  ENMT: oropharynx clear without erythema, no exudates, +dry mucous membranes  NECK: supple, soft, no thyromegaly noted  LUNGS: clear to auscultation bilaterally, no wheezing, rhonchi or rales appreciated  HEART: S1/S2,+tachycardic, no murmurs noted, no lower extremity edema  GASTROINTESTINAL: abdomen is soft, nondistended, +epigastric tenderness, normoactive bowel sounds, no palpable masses  INTEGUMENT: good skin turgor, no lesions noted  MUSCULOSKELETAL: no clubbing or cyanosis, no obvious deformity  NEUROLOGIC: AAO x3, CNII-XII intact, +b/l hand tremors, +tongue fasciculations

## 2023-05-07 NOTE — H&P ADULT - NSICDXFAMILYHX_GEN_ALL_CORE_FT
FAMILY HISTORY:  Father  Still living? Unknown  FH: alcohol abuse, Age at diagnosis: Age Unknown

## 2023-05-07 NOTE — ED ADULT NURSE NOTE - OBJECTIVE STATEMENT
Pt presents to ED with c/o abdominal pain, vomiting, diarrhea x 2 days. Reports being 25 days sober and started drinking alcohol past 2 days. Denies any drug use.

## 2023-05-07 NOTE — CONSULT NOTE ADULT - SUBJECTIVE AND OBJECTIVE BOX
Patient is a 43y old  Male who presents with a chief complaint of Pancreatitis, Alcoholic Ketosis (07 May 2023 13:56)      HPI:  43 year old male with PMHx EtOH abuse and tobacco dependence presents with abdominal pain. History obtained from cousin at bedside, states that patient has a prolonged history of EtOH abuse and has been sober for 25 days before relapsing recently after getting money from a new job. States that patient has had nonstop nausea and vomiting over the past 3 days and has not eaten in over a week. Patient reportedly drinks 250ml vodka at least once a day, last drink was yesterday evening. Patient and cousin deny use of any other substances aside from tobacco use. NKDA (07 May 2023 13:56)      Allergies    No Known Allergies    Intolerances        MEDICATIONS  (STANDING):  folic acid 1 milliGRAM(s) Oral daily  heparin   Injectable 5000 Unit(s) SubCutaneous every 8 hours  multivitamin 1 Tablet(s) Oral daily  sodium bicarbonate  Infusion 0.129 mEq/kG/Hr (60 mL/Hr) IV Continuous <Continuous>  thiamine IVPB 500 milliGRAM(s) IV Intermittent daily    MEDICATIONS  (PRN):  acetaminophen     Tablet .. 650 milliGRAM(s) Oral every 6 hours PRN Temp greater or equal to 38C (100.4F), Mild Pain (1 - 3)  HYDROmorphone  Injectable 1 milliGRAM(s) IV Push every 6 hours PRN Severe Pain (7 - 10)  LORazepam   Injectable 2 milliGRAM(s) IV Push every 2 hours PRN Symptom-triggered: 2 point increase in CIWA -Ar score and a total score of 7 or LESS  traMADol 25 milliGRAM(s) Oral every 6 hours PRN Moderate Pain (4 - 6)      Daily Height in cm: 182.88 (07 May 2023 10:43)    Daily     Drug Dosing Weight  Height (cm): 182.9 (07 May 2023 10:43)  Weight (kg): 69.9 (07 May 2023 10:43)  BMI (kg/m2): 20.9 (07 May 2023 10:43)  BSA (m2): 1.91 (07 May 2023 10:43)    PAST MEDICAL & SURGICAL HISTORY:  No pertinent past medical history      No significant past surgical history          FAMILY HISTORY:  FH: alcohol abuse (Father)        SOCIAL HISTORY:    ADVANCE DIRECTIVES:    REVIEW OF SYSTEMS:    CONSTITUTIONAL: No fever, weight loss, or fatigue  EYES: No eye pain, visual disturbances, or discharge  ENMT:  No difficulty hearing, tinnitus, vertigo; No sinus or throat pain  NECK: No pain or stiffness  BREASTS: No pain, masses, or nipple discharge  RESPIRATORY: No cough, wheezing, chills or hemoptysis; No shortness of breath  CARDIOVASCULAR: No chest pain, palpitations, dizziness, or leg swelling  GASTROINTESTINAL: No abdominal or epigastric pain. No nausea, vomiting, or hematemesis; No diarrhea or constipation. No melena or hematochezia.  GENITOURINARY: No dysuria, frequency, hematuria, or incontinence  NEUROLOGICAL: No headaches, memory loss, loss of strength, numbness, or tremors  SKIN: No itching, burning, rashes, or lesions   LYMPH NODES: No enlarged glands  ENDOCRINE: No heat or cold intolerance; No hair loss  MUSCULOSKELETAL: No joint pain or swelling; No muscle, back, or extremity pain  PSYCHIATRIC: No depression, anxiety, mood swings, or difficulty sleeping  HEME/LYMPH: No easy bruising, or bleeding gums  ALLERGY AND IMMUNOLOGIC: No hives or eczema          ICU Vital Signs Last 24 Hrs  T(C): 36.7 (07 May 2023 14:22), Max: 36.7 (07 May 2023 10:43)  T(F): 98 (07 May 2023 14:22), Max: 98 (07 May 2023 10:43)  HR: 98 (07 May 2023 14:22) (95 - 100)  BP: 106/87 (07 May 2023 14:22) (71/46 - 106/87)  BP(mean): --  ABP: --  ABP(mean): --  RR: 19 (07 May 2023 14:22) (16 - 20)  SpO2: 100% (07 May 2023 14:22) (100% - 100%)    O2 Parameters below as of 07 May 2023 14:22  Patient On (Oxygen Delivery Method): room air            ABG - ( 07 May 2023 14:05 )  pH, Arterial: 7.23  pH, Blood: x     /  pCO2: 18    /  pO2: 112   / HCO3: 8     / Base Excess: -17.9 /  SaO2: 99                  I&O's Detail      PHYSICAL EXAM:    GENERAL: NAD, well-groomed, well-developed  HEAD:  Atraumatic, Normocephalic  EYES: EOMI, PERRLA, conjunctiva and sclera clear  ENMT: No tonsillar erythema, exudates, or enlargement; Moist mucous membranes, Good dentition, No lesions  NECK: Supple, No JVD, Normal thyroid  NERVOUS SYSTEM:  Alert & Oriented X3, Good concentration; Motor Strength 5/5 B/L upper and lower extremities; DTRs 2+ intact and symmetric  CHEST/LUNG: Clear to percussion bilaterally; No rales, rhonchi, wheezing, or rubs  HEART: Regular rate and rhythm; No murmurs, rubs, or gallops  ABDOMEN: Soft, Nontender, Nondistended; Bowel sounds present  EXTREMITIES:  2+ Peripheral Pulses, No clubbing, cyanosis, or edema  LYMPH: No lymphadenopathy noted  SKIN: No rashes or lesions    LABS:  CBC Full  -  ( 07 May 2023 14:10 )  WBC Count : 12.24 K/uL  RBC Count : 4.15 M/uL  Hemoglobin : 12.0 g/dL  Hematocrit : 36.7 %  Platelet Count - Automated : x  Mean Cell Volume : 88.4 fl  Mean Cell Hemoglobin : 28.9 pg  Mean Cell Hemoglobin Concentration : 32.7 gm/dL  Auto Neutrophil # : 11.38 K/uL  Auto Lymphocyte # : 0.26 K/uL  Auto Monocyte # : 0.53 K/uL  Auto Eosinophil # : 0.00 K/uL  Auto Basophil # : 0.01 K/uL  Auto Neutrophil % : 93.0 %  Auto Lymphocyte % : 2.1 %  Auto Monocyte % : 4.3 %  Auto Eosinophil % : 0.0 %  Auto Basophil % : 0.1 %    05-07    133<L>  |  97  |  24<H>  ----------------------------<  113<H>  4.5   |  9<LL>  |  1.94<H>    Ca    7.8<L>      07 May 2023 14:10    TPro  6.4  /  Alb  3.4<L>  /  TBili  0.7  /  DBili  x   /  AST  73<H>  /  ALT  47  /  AlkPhos  77  05-07    CAPILLARY BLOOD GLUCOSE                    EKG:    ECHO, US:    RADIOLOGY:    CRITICAL CARE TIME SPENT:   Patient is a 43y old  Male who presents with a chief complaint of Pancreatitis, Alcoholic Ketosis (07 May 2023 13:56)      HPI:  43 year old male with PMHx EtOH abuse and tobacco dependence presents with abdominal pain. History obtained from cousin at bedside, states that patient has a prolonged history of EtOH abuse and has been sober for 25 days before relapsing recently after getting money from a new job. States that patient has had nonstop nausea and vomiting over the past 3 days and has not eaten in over a week. Patient reportedly drinks 250ml vodka at least once a day, last drink was yesterday evening. Patient and cousin deny use of any other substances aside from tobacco use. NKDA (07 May 2023 13:56)      Allergies    No Known Allergies    Intolerances        MEDICATIONS  (STANDING):  folic acid 1 milliGRAM(s) Oral daily  heparin   Injectable 5000 Unit(s) SubCutaneous every 8 hours  multivitamin 1 Tablet(s) Oral daily  sodium bicarbonate  Infusion 0.129 mEq/kG/Hr (60 mL/Hr) IV Continuous <Continuous>  thiamine IVPB 500 milliGRAM(s) IV Intermittent daily    MEDICATIONS  (PRN):  acetaminophen     Tablet .. 650 milliGRAM(s) Oral every 6 hours PRN Temp greater or equal to 38C (100.4F), Mild Pain (1 - 3)  HYDROmorphone  Injectable 1 milliGRAM(s) IV Push every 6 hours PRN Severe Pain (7 - 10)  LORazepam   Injectable 2 milliGRAM(s) IV Push every 2 hours PRN Symptom-triggered: 2 point increase in CIWA -Ar score and a total score of 7 or LESS  traMADol 25 milliGRAM(s) Oral every 6 hours PRN Moderate Pain (4 - 6)      Daily Height in cm: 182.88 (07 May 2023 10:43)    Daily     Drug Dosing Weight  Height (cm): 182.9 (07 May 2023 10:43)  Weight (kg): 69.9 (07 May 2023 10:43)  BMI (kg/m2): 20.9 (07 May 2023 10:43)  BSA (m2): 1.91 (07 May 2023 10:43)    PAST MEDICAL & SURGICAL HISTORY:  No pertinent past medical history      No significant past surgical history          FAMILY HISTORY:  FH: alcohol abuse (Father)    Social History:  Patient lives with elderly mother. Unemployed (07 May 2023 13:56)    ADVANCE DIRECTIVES:    REVIEW OF SYSTEMS:    CONSTITUTIONAL: No fever, weight loss, or fatigue  EYES: No eye pain, visual disturbances, or discharge  ENMT:  No difficulty hearing, tinnitus, vertigo; No sinus or throat pain  NECK: No pain or stiffness  BREASTS: No pain, masses, or nipple discharge  RESPIRATORY: No cough, wheezing, chills or hemoptysis; No shortness of breath  CARDIOVASCULAR: No chest pain, palpitations, dizziness, or leg swelling  GASTROINTESTINAL: No abdominal or epigastric pain. No nausea, vomiting, or hematemesis; No diarrhea or constipation. No melena or hematochezia.  GENITOURINARY: No dysuria, frequency, hematuria, or incontinence  NEUROLOGICAL: No headaches, memory loss, loss of strength, numbness, or tremors  SKIN: No itching, burning, rashes, or lesions   LYMPH NODES: No enlarged glands  ENDOCRINE: No heat or cold intolerance; No hair loss  MUSCULOSKELETAL: No joint pain or swelling; No muscle, back, or extremity pain  PSYCHIATRIC: No depression, anxiety, mood swings, or difficulty sleeping  HEME/LYMPH: No easy bruising, or bleeding gums  ALLERGY AND IMMUNOLOGIC: No hives or eczema          ICU Vital Signs Last 24 Hrs  T(C): 36.7 (07 May 2023 14:22), Max: 36.7 (07 May 2023 10:43)  T(F): 98 (07 May 2023 14:22), Max: 98 (07 May 2023 10:43)  HR: 98 (07 May 2023 14:22) (95 - 100)  BP: 106/87 (07 May 2023 14:22) (71/46 - 106/87)  BP(mean): --  ABP: --  ABP(mean): --  RR: 19 (07 May 2023 14:22) (16 - 20)  SpO2: 100% (07 May 2023 14:22) (100% - 100%)    O2 Parameters below as of 07 May 2023 14:22  Patient On (Oxygen Delivery Method): room air            ABG - ( 07 May 2023 14:05 )  pH, Arterial: 7.23  pH, Blood: x     /  pCO2: 18    /  pO2: 112   / HCO3: 8     / Base Excess: -17.9 /  SaO2: 99                  I&O's Detail      PHYSICAL EXAM:    GENERAL: NAD, well-groomed, well-developed  HEAD:  Atraumatic, Normocephalic  EYES: EOMI, PERRLA, conjunctiva and sclera clear  ENMT: No tonsillar erythema, exudates, or enlargement; Moist mucous membranes, Good dentition, No lesions  NECK: Supple, No JVD, Normal thyroid  NERVOUS SYSTEM:  Alert & Oriented X3, Good concentration; Motor Strength 5/5 B/L upper and lower extremities; DTRs 2+ intact and symmetric  CHEST/LUNG: Clear to percussion bilaterally; No rales, rhonchi, wheezing, or rubs  HEART: Regular rate and rhythm; No murmurs, rubs, or gallops  ABDOMEN: Soft, Nontender, Nondistended; Bowel sounds present  EXTREMITIES:  2+ Peripheral Pulses, No clubbing, cyanosis, or edema  LYMPH: No lymphadenopathy noted  SKIN: No rashes or lesions    LABS:  CBC Full  -  ( 07 May 2023 14:10 )  WBC Count : 12.24 K/uL  RBC Count : 4.15 M/uL  Hemoglobin : 12.0 g/dL  Hematocrit : 36.7 %  Platelet Count - Automated : x  Mean Cell Volume : 88.4 fl  Mean Cell Hemoglobin : 28.9 pg  Mean Cell Hemoglobin Concentration : 32.7 gm/dL  Auto Neutrophil # : 11.38 K/uL  Auto Lymphocyte # : 0.26 K/uL  Auto Monocyte # : 0.53 K/uL  Auto Eosinophil # : 0.00 K/uL  Auto Basophil # : 0.01 K/uL  Auto Neutrophil % : 93.0 %  Auto Lymphocyte % : 2.1 %  Auto Monocyte % : 4.3 %  Auto Eosinophil % : 0.0 %  Auto Basophil % : 0.1 %    05-07    133<L>  |  97  |  24<H>  ----------------------------<  113<H>  4.5   |  9<LL>  |  1.94<H>    Ca    7.8<L>      07 May 2023 14:10    TPro  6.4  /  Alb  3.4<L>  /  TBili  0.7  /  DBili  x   /  AST  73<H>  /  ALT  47  /  AlkPhos  77  05-07    CAPILLARY BLOOD GLUCOSE                    EKG:    ECHO, US:    RADIOLOGY:    CRITICAL CARE TIME SPENT:   Patient is a 43y old  Male who presents with a chief complaint of Pancreatitis, Alcoholic Ketosis (07 May 2023 13:56)      HPI:  43 year old male with PMHx EtOH abuse and tobacco dependence presents with abdominal pain. History obtained from cousin at bedside, states that patient has a prolonged history of EtOH abuse and has been sober for 25 days before relapsing recently after getting money from a new job. States that patient has had nonstop nausea and vomiting over the past 3 days and has not eaten in over a week. Patient reportedly drinks 250ml vodka at least once a day, last drink was yesterday evening. Patient and cousin deny use of any other substances aside from tobacco use. NKDA (07 May 2023 13:56)    Interval history: Patient is a 44 yo with no prior medical history, hx of Alcohol abuse and tobacco dependence, p/w abdominal pain and vomiting, patient admitted for Alcoholic ketoacidosis and pancreatitis. Endorses epigastric pain, no radiation, last drink was yesterday, drank about 1/2 bottle of vodka yesterday. Cousin at bedside says patient last visited a doctor 3 years ago, lives with his elderly mother (who is unwell, cannot walk or care for herself). No hx of any other drug use. ICU consulted for close hemodynamic monitoring givens severe lactic acidosis with HAGMA.    Allergies    No Known Allergies    Intolerances        MEDICATIONS  (STANDING):  folic acid 1 milliGRAM(s) Oral daily  heparin   Injectable 5000 Unit(s) SubCutaneous every 8 hours  multivitamin 1 Tablet(s) Oral daily  sodium bicarbonate  Infusion 0.129 mEq/kG/Hr (60 mL/Hr) IV Continuous <Continuous>  thiamine IVPB 500 milliGRAM(s) IV Intermittent daily    MEDICATIONS  (PRN):  acetaminophen     Tablet .. 650 milliGRAM(s) Oral every 6 hours PRN Temp greater or equal to 38C (100.4F), Mild Pain (1 - 3)  HYDROmorphone  Injectable 1 milliGRAM(s) IV Push every 6 hours PRN Severe Pain (7 - 10)  LORazepam   Injectable 2 milliGRAM(s) IV Push every 2 hours PRN Symptom-triggered: 2 point increase in CIWA -Ar score and a total score of 7 or LESS  traMADol 25 milliGRAM(s) Oral every 6 hours PRN Moderate Pain (4 - 6)      Daily Height in cm: 182.88 (07 May 2023 10:43)    Daily     Drug Dosing Weight  Height (cm): 182.9 (07 May 2023 10:43)  Weight (kg): 69.9 (07 May 2023 10:43)  BMI (kg/m2): 20.9 (07 May 2023 10:43)  BSA (m2): 1.91 (07 May 2023 10:43)    PAST MEDICAL & SURGICAL HISTORY:  No pertinent past medical history      No significant past surgical history          FAMILY HISTORY:  FH: alcohol abuse (Father)    Social History:  Patient lives with elderly mother. Unemployed (07 May 2023 13:56)    REVIEW OF SYSTEMS:    CONSTITUTIONAL: No weakness, fevers or chills  EYES/ENT: No visual changes;  No vertigo or throat pain   NECK: No pain or stiffness  RESPIRATORY: No cough, wheezing, hemoptysis; No shortness of breath  CARDIOVASCULAR: No chest pain or palpitations  GASTROINTESTINAL: abdominal pain+, vomiting  GENITOURINARY: No dysuria, frequency or hematuria  NEUROLOGICAL: No numbness or weakness  SKIN: No itching, burning, rashes, or lesions   All other review of systems is negative unless indicated above.        ICU Vital Signs Last 24 Hrs  T(C): 36.7 (07 May 2023 14:22), Max: 36.7 (07 May 2023 10:43)  T(F): 98 (07 May 2023 14:22), Max: 98 (07 May 2023 10:43)  HR: 98 (07 May 2023 14:22) (95 - 100)  BP: 106/87 (07 May 2023 14:22) (71/46 - 106/87)  BP(mean): --  ABP: --  ABP(mean): --  RR: 19 (07 May 2023 14:22) (16 - 20)  SpO2: 100% (07 May 2023 14:22) (100% - 100%)    O2 Parameters below as of 07 May 2023 14:22  Patient On (Oxygen Delivery Method): room air            ABG - ( 07 May 2023 14:05 )  pH, Arterial: 7.23  pH, Blood: x     /  pCO2: 18    /  pO2: 112   / HCO3: 8     / Base Excess: -17.9 /  SaO2: 99                  I&O's Detail    PHYSICAL EXAM:  GENERAL: NAD, well-developed  HEAD:  Atraumatic, Normocephalic  EYES: EOMI, PERRLA, conjunctiva and sclera clear  NECK: Supple, No JVD  CHEST/LUNG: Clear to auscultation bilaterally; No wheeze  HEART: Regular rate and rhythm; s1+ s2+  ABDOMEN: epigastric tenderness, nondistended, bowel sounds+  EXTREMITIES:, No clubbing, cyanosis, or edema  NEUROLOGY: AAOx3 non-focal  SKIN: No rashes or lesions    LABS:  CBC Full  -  ( 07 May 2023 14:10 )  WBC Count : 12.24 K/uL  RBC Count : 4.15 M/uL  Hemoglobin : 12.0 g/dL  Hematocrit : 36.7 %  Platelet Count - Automated : x  Mean Cell Volume : 88.4 fl  Mean Cell Hemoglobin : 28.9 pg  Mean Cell Hemoglobin Concentration : 32.7 gm/dL  Auto Neutrophil # : 11.38 K/uL  Auto Lymphocyte # : 0.26 K/uL  Auto Monocyte # : 0.53 K/uL  Auto Eosinophil # : 0.00 K/uL  Auto Basophil # : 0.01 K/uL  Auto Neutrophil % : 93.0 %  Auto Lymphocyte % : 2.1 %  Auto Monocyte % : 4.3 %  Auto Eosinophil % : 0.0 %  Auto Basophil % : 0.1 %    05-07    133<L>  |  97  |  24<H>  ----------------------------<  113<H>  4.5   |  9<LL>  |  1.94<H>    Ca    7.8<L>      07 May 2023 14:10    TPro  6.4  /  Alb  3.4<L>  /  TBili  0.7  /  DBili  x   /  AST  73<H>  /  ALT  47  /  AlkPhos  77  05-07    CAPILLARY BLOOD GLUCOSE                CRITICAL CARE TIME SPENT:

## 2023-05-07 NOTE — H&P ADULT - HISTORY OF PRESENT ILLNESS
43 year old male with PMHx EtOH abuse and tobacco dependence presents with abdominal pain. History obtained from cousin at bedside, states that patient has a prolonged history of EtOH abuse and has been sober for 25 days before relapsing recently after getting money from a new job. States that patient has had nonstop nausea and vomiting over the past 3 days and has not eaten in over a week. Patient reportedly drinks 250ml vodka at least once a day, last drink was yesterday evening. Patient and cousin deny use of any other substances aside from tobacco use. NKDA

## 2023-05-07 NOTE — H&P ADULT - PROBLEM SELECTOR PLAN 4
BOB protocol  thiamine, folate, multivitamin  Ativan for sx-triggered therapy   SBIRT   SW consult   family interested in rehab

## 2023-05-07 NOTE — ED PROVIDER NOTE - PROGRESS NOTE DETAILS
[place]: pt with low CO2, high ag, normal glucose, not taking any meds, no clinical sign of methanol/ethylene glycol ingestion (pt not ams out of proportion and hx consistent with etoh along with labs). no active withdrawal or vomiting. blood gas ordered. started on d5 already receive cocktail for alcoholic patients.

## 2023-05-08 LAB
ANION GAP SERPL CALC-SCNC: 18 MMOL/L — HIGH (ref 5–17)
ANION GAP SERPL CALC-SCNC: 6 MMOL/L — SIGNIFICANT CHANGE UP (ref 5–17)
ANION GAP SERPL CALC-SCNC: 9 MMOL/L — SIGNIFICANT CHANGE UP (ref 5–17)
BASOPHILS # BLD AUTO: 0.01 K/UL — SIGNIFICANT CHANGE UP (ref 0–0.2)
BASOPHILS NFR BLD AUTO: 0.1 % — SIGNIFICANT CHANGE UP (ref 0–2)
BUN SERPL-MCNC: 13 MG/DL — SIGNIFICANT CHANGE UP (ref 7–18)
BUN SERPL-MCNC: 17 MG/DL — SIGNIFICANT CHANGE UP (ref 7–18)
BUN SERPL-MCNC: 4 MG/DL — LOW (ref 7–18)
CALCIUM SERPL-MCNC: 7.4 MG/DL — LOW (ref 8.4–10.5)
CALCIUM SERPL-MCNC: 8 MG/DL — LOW (ref 8.4–10.5)
CALCIUM SERPL-MCNC: 8.7 MG/DL — SIGNIFICANT CHANGE UP (ref 8.4–10.5)
CHLORIDE SERPL-SCNC: 100 MMOL/L — SIGNIFICANT CHANGE UP (ref 96–108)
CHLORIDE SERPL-SCNC: 105 MMOL/L — SIGNIFICANT CHANGE UP (ref 96–108)
CHLORIDE SERPL-SCNC: 99 MMOL/L — SIGNIFICANT CHANGE UP (ref 96–108)
CO2 SERPL-SCNC: 10 MMOL/L — CRITICAL LOW (ref 22–31)
CO2 SERPL-SCNC: 25 MMOL/L — SIGNIFICANT CHANGE UP (ref 22–31)
CO2 SERPL-SCNC: 32 MMOL/L — HIGH (ref 22–31)
CREAT SERPL-MCNC: 0.48 MG/DL — LOW (ref 0.5–1.3)
CREAT SERPL-MCNC: 0.59 MG/DL — SIGNIFICANT CHANGE UP (ref 0.5–1.3)
CREAT SERPL-MCNC: 1.04 MG/DL — SIGNIFICANT CHANGE UP (ref 0.5–1.3)
CREAT SERPL-MCNC: 1.08 MG/DL — SIGNIFICANT CHANGE UP (ref 0.5–1.3)
CULTURE RESULTS: SIGNIFICANT CHANGE UP
EGFR: 124 ML/MIN/1.73M2 — SIGNIFICANT CHANGE UP
EGFR: 132 ML/MIN/1.73M2 — SIGNIFICANT CHANGE UP
EGFR: 88 ML/MIN/1.73M2 — SIGNIFICANT CHANGE UP
EGFR: 92 ML/MIN/1.73M2 — SIGNIFICANT CHANGE UP
EOSINOPHIL # BLD AUTO: 0 K/UL — SIGNIFICANT CHANGE UP (ref 0–0.5)
EOSINOPHIL NFR BLD AUTO: 0 % — SIGNIFICANT CHANGE UP (ref 0–6)
GLUCOSE BLDC GLUCOMTR-MCNC: 172 MG/DL — HIGH (ref 70–99)
GLUCOSE BLDC GLUCOMTR-MCNC: 178 MG/DL — HIGH (ref 70–99)
GLUCOSE SERPL-MCNC: 204 MG/DL — HIGH (ref 70–99)
GLUCOSE SERPL-MCNC: 82 MG/DL — SIGNIFICANT CHANGE UP (ref 70–99)
GLUCOSE SERPL-MCNC: 98 MG/DL — SIGNIFICANT CHANGE UP (ref 70–99)
HCT VFR BLD CALC: 28.8 % — LOW (ref 39–50)
HGB BLD-MCNC: 10.1 G/DL — LOW (ref 13–17)
IMM GRANULOCYTES NFR BLD AUTO: 0.4 % — SIGNIFICANT CHANGE UP (ref 0–0.9)
LACTATE SERPL-SCNC: 1.7 MMOL/L — SIGNIFICANT CHANGE UP (ref 0.7–2)
LYMPHOCYTES # BLD AUTO: 0.33 K/UL — LOW (ref 1–3.3)
LYMPHOCYTES # BLD AUTO: 3.4 % — LOW (ref 13–44)
MAGNESIUM SERPL-MCNC: 1.3 MG/DL — LOW (ref 1.6–2.6)
MAGNESIUM SERPL-MCNC: 1.5 MG/DL — LOW (ref 1.6–2.6)
MAGNESIUM SERPL-MCNC: 1.6 MG/DL — SIGNIFICANT CHANGE UP (ref 1.6–2.6)
MCHC RBC-ENTMCNC: 28.7 PG — SIGNIFICANT CHANGE UP (ref 27–34)
MCHC RBC-ENTMCNC: 35.1 GM/DL — SIGNIFICANT CHANGE UP (ref 32–36)
MCV RBC AUTO: 81.8 FL — SIGNIFICANT CHANGE UP (ref 80–100)
MONOCYTES # BLD AUTO: 0.56 K/UL — SIGNIFICANT CHANGE UP (ref 0–0.9)
MONOCYTES NFR BLD AUTO: 5.7 % — SIGNIFICANT CHANGE UP (ref 2–14)
MRSA PCR RESULT.: SIGNIFICANT CHANGE UP
NEUTROPHILS # BLD AUTO: 8.83 K/UL — HIGH (ref 1.8–7.4)
NEUTROPHILS NFR BLD AUTO: 90.4 % — HIGH (ref 43–77)
NRBC # BLD: 0 /100 WBCS — SIGNIFICANT CHANGE UP (ref 0–0)
PHOSPHATE SERPL-MCNC: 0.5 MG/DL — CRITICAL LOW (ref 2.5–4.5)
PHOSPHATE SERPL-MCNC: 1.1 MG/DL — LOW (ref 2.5–4.5)
PHOSPHATE SERPL-MCNC: 1.2 MG/DL — LOW (ref 2.5–4.5)
PHOSPHATE SERPL-MCNC: 1.2 MG/DL — LOW (ref 2.5–4.5)
PLATELET # BLD AUTO: 59 K/UL — LOW (ref 150–400)
POTASSIUM SERPL-MCNC: 3.4 MMOL/L — LOW (ref 3.5–5.3)
POTASSIUM SERPL-MCNC: 3.5 MMOL/L — SIGNIFICANT CHANGE UP (ref 3.5–5.3)
POTASSIUM SERPL-MCNC: 4.3 MMOL/L — SIGNIFICANT CHANGE UP (ref 3.5–5.3)
POTASSIUM SERPL-SCNC: 3.4 MMOL/L — LOW (ref 3.5–5.3)
POTASSIUM SERPL-SCNC: 3.5 MMOL/L — SIGNIFICANT CHANGE UP (ref 3.5–5.3)
POTASSIUM SERPL-SCNC: 4.3 MMOL/L — SIGNIFICANT CHANGE UP (ref 3.5–5.3)
RBC # BLD: 3.52 M/UL — LOW (ref 4.2–5.8)
RBC # FLD: 16.5 % — HIGH (ref 10.3–14.5)
S AUREUS DNA NOSE QL NAA+PROBE: DETECTED
SODIUM SERPL-SCNC: 133 MMOL/L — LOW (ref 135–145)
SODIUM SERPL-SCNC: 134 MMOL/L — LOW (ref 135–145)
SODIUM SERPL-SCNC: 137 MMOL/L — SIGNIFICANT CHANGE UP (ref 135–145)
SPECIMEN SOURCE: SIGNIFICANT CHANGE UP
WBC # BLD: 9.77 K/UL — SIGNIFICANT CHANGE UP (ref 3.8–10.5)
WBC # FLD AUTO: 9.77 K/UL — SIGNIFICANT CHANGE UP (ref 3.8–10.5)

## 2023-05-08 PROCEDURE — 99233 SBSQ HOSP IP/OBS HIGH 50: CPT | Mod: GC

## 2023-05-08 RX ORDER — SODIUM,POTASSIUM PHOSPHATES 278-250MG
1 POWDER IN PACKET (EA) ORAL THREE TIMES A DAY
Refills: 0 | Status: COMPLETED | OUTPATIENT
Start: 2023-05-08 | End: 2023-05-09

## 2023-05-08 RX ORDER — POTASSIUM PHOSPHATE, MONOBASIC POTASSIUM PHOSPHATE, DIBASIC 236; 224 MG/ML; MG/ML
30 INJECTION, SOLUTION INTRAVENOUS ONCE
Refills: 0 | Status: DISCONTINUED | OUTPATIENT
Start: 2023-05-08 | End: 2023-05-08

## 2023-05-08 RX ORDER — PHENOBARBITAL 60 MG
260 TABLET ORAL ONCE
Refills: 0 | Status: DISCONTINUED | OUTPATIENT
Start: 2023-05-08 | End: 2023-05-09

## 2023-05-08 RX ORDER — SODIUM CHLORIDE 9 MG/ML
1000 INJECTION, SOLUTION INTRAVENOUS
Refills: 0 | Status: DISCONTINUED | OUTPATIENT
Start: 2023-05-08 | End: 2023-05-09

## 2023-05-08 RX ORDER — MUPIROCIN 20 MG/G
1 OINTMENT TOPICAL
Refills: 0 | Status: DISCONTINUED | OUTPATIENT
Start: 2023-05-08 | End: 2023-05-12

## 2023-05-08 RX ORDER — SODIUM,POTASSIUM PHOSPHATES 278-250MG
1 POWDER IN PACKET (EA) ORAL ONCE
Refills: 0 | Status: COMPLETED | OUTPATIENT
Start: 2023-05-08 | End: 2023-05-08

## 2023-05-08 RX ORDER — CHLORHEXIDINE GLUCONATE 213 G/1000ML
1 SOLUTION TOPICAL
Refills: 0 | Status: DISCONTINUED | OUTPATIENT
Start: 2023-05-08 | End: 2023-05-09

## 2023-05-08 RX ORDER — POTASSIUM PHOSPHATE, MONOBASIC POTASSIUM PHOSPHATE, DIBASIC 236; 224 MG/ML; MG/ML
30 INJECTION, SOLUTION INTRAVENOUS EVERY 6 HOURS
Refills: 0 | Status: COMPLETED | OUTPATIENT
Start: 2023-05-08 | End: 2023-05-09

## 2023-05-08 RX ORDER — SODIUM CHLORIDE 9 MG/ML
500 INJECTION, SOLUTION INTRAVENOUS ONCE
Refills: 0 | Status: COMPLETED | OUTPATIENT
Start: 2023-05-08 | End: 2023-05-08

## 2023-05-08 RX ADMIN — SODIUM CHLORIDE 200 MILLILITER(S): 9 INJECTION, SOLUTION INTRAVENOUS at 02:19

## 2023-05-08 RX ADMIN — Medication 2 MILLIGRAM(S): at 06:23

## 2023-05-08 RX ADMIN — Medication 2 MILLIGRAM(S): at 22:40

## 2023-05-08 RX ADMIN — Medication 2 MILLIGRAM(S): at 09:00

## 2023-05-08 RX ADMIN — Medication 2 MILLIGRAM(S): at 02:26

## 2023-05-08 RX ADMIN — Medication 650 MILLIGRAM(S): at 00:24

## 2023-05-08 RX ADMIN — HEPARIN SODIUM 5000 UNIT(S): 5000 INJECTION INTRAVENOUS; SUBCUTANEOUS at 21:40

## 2023-05-08 RX ADMIN — PANTOPRAZOLE SODIUM 40 MILLIGRAM(S): 20 TABLET, DELAYED RELEASE ORAL at 12:12

## 2023-05-08 RX ADMIN — Medication 650 MILLIGRAM(S): at 01:30

## 2023-05-08 RX ADMIN — Medication 1 PACKET(S): at 21:41

## 2023-05-08 RX ADMIN — Medication 1 TABLET(S): at 12:12

## 2023-05-08 RX ADMIN — CHLORHEXIDINE GLUCONATE 1 APPLICATION(S): 213 SOLUTION TOPICAL at 06:25

## 2023-05-08 RX ADMIN — MUPIROCIN 1 APPLICATION(S): 20 OINTMENT TOPICAL at 17:05

## 2023-05-08 RX ADMIN — SODIUM CHLORIDE 90 MILLILITER(S): 9 INJECTION, SOLUTION INTRAVENOUS at 21:46

## 2023-05-08 RX ADMIN — Medication 1 PACKET(S): at 14:09

## 2023-05-08 RX ADMIN — HEPARIN SODIUM 5000 UNIT(S): 5000 INJECTION INTRAVENOUS; SUBCUTANEOUS at 13:16

## 2023-05-08 RX ADMIN — Medication 650 MILLIGRAM(S): at 21:54

## 2023-05-08 RX ADMIN — HYDROMORPHONE HYDROCHLORIDE 1 MILLIGRAM(S): 2 INJECTION INTRAMUSCULAR; INTRAVENOUS; SUBCUTANEOUS at 00:49

## 2023-05-08 RX ADMIN — SODIUM CHLORIDE 500 MILLILITER(S): 9 INJECTION, SOLUTION INTRAVENOUS at 00:49

## 2023-05-08 RX ADMIN — POTASSIUM PHOSPHATE, MONOBASIC POTASSIUM PHOSPHATE, DIBASIC 83.33 MILLIMOLE(S): 236; 224 INJECTION, SOLUTION INTRAVENOUS at 17:05

## 2023-05-08 RX ADMIN — Medication 105 MILLIGRAM(S): at 12:12

## 2023-05-08 RX ADMIN — HYDROMORPHONE HYDROCHLORIDE 1 MILLIGRAM(S): 2 INJECTION INTRAMUSCULAR; INTRAVENOUS; SUBCUTANEOUS at 01:20

## 2023-05-08 RX ADMIN — HEPARIN SODIUM 5000 UNIT(S): 5000 INJECTION INTRAVENOUS; SUBCUTANEOUS at 06:23

## 2023-05-08 RX ADMIN — Medication 1 MILLIGRAM(S): at 12:12

## 2023-05-08 NOTE — PROGRESS NOTE ADULT - SUBJECTIVE AND OBJECTIVE BOX
Patient is a 43y old  Male who presents with a chief complaint of Pancreatitis, Alcoholic Ketosis (07 May 2023 15:35)    pt seen in icu [  ], reg med floor [   ], bed [  ], chair at bedside [   ], a+o x3 [  ], lethargic [  ],  nad [  ]    orozco [  ], ngt [  ], peg [  ], et tube [  ], cent line [  ], picc line [  ]        Allergies    No Known Allergies        Vitals    T(F): 98.7 (05-08-23 @ 06:00), Max: 100.4 (05-08-23 @ 00:00)  HR: 114 (05-08-23 @ 06:00) (95 - 150)  BP: 113/79 (05-08-23 @ 06:00) (71/46 - 133/84)  RR: 22 (05-08-23 @ 06:00) (16 - 34)  SpO2: 99% (05-08-23 @ 06:00) (96% - 100%)  Wt(kg): --  CAPILLARY BLOOD GLUCOSE      POCT Blood Glucose.: 178 mg/dL (08 May 2023 06:05)      Labs                          10.1   9.77  )-----------( 59       ( 08 May 2023 05:03 )             28.8       05-08    x   |  x   |  x   ----------------------------<  x   x    |  x   |  1.04    Ca    7.4<L>      07 May 2023 20:00  Phos  1.1     05-08  Mg     1.6     05-08    TPro  6.4  /  Alb  3.5  /  TBili  1.0  /  DBili  x   /  AST  78<H>  /  ALT  47  /  AlkPhos  79  05-07      CARDIAC MARKERS ( 07 May 2023 14:10 )  x     / x     / 120 U/L / x     / 3.8 ng/mL      Troponin I, High Sensitivity Result: 8.6 ng/L (05-07-23 @ 14:10)        Radiology Results      Meds    MEDICATIONS  (STANDING):  chlorhexidine 2% Cloths 1 Application(s) Topical <User Schedule>  dextrose 5% + lactated ringers. 1000 milliLiter(s) (200 mL/Hr) IV Continuous <Continuous>  folic acid 1 milliGRAM(s) Oral daily  heparin   Injectable 5000 Unit(s) SubCutaneous every 8 hours  LORazepam   Injectable 2 milliGRAM(s) IV Push every 4 hours  multivitamin 1 Tablet(s) Oral daily  pantoprazole  Injectable 40 milliGRAM(s) IV Push daily  thiamine IVPB 500 milliGRAM(s) IV Intermittent daily      MEDICATIONS  (PRN):  acetaminophen     Tablet .. 650 milliGRAM(s) Oral every 6 hours PRN Temp greater or equal to 38C (100.4F), Mild Pain (1 - 3)  HYDROmorphone  Injectable 1 milliGRAM(s) IV Push every 6 hours PRN Severe Pain (7 - 10)  LORazepam   Injectable 1 milliGRAM(s) IV Push every 1 hour PRN Symptom-triggered: 2 point increase in CIWA -Ar score and a total score of 7 or LESS  traMADol 25 milliGRAM(s) Oral every 6 hours PRN Moderate Pain (4 - 6)      Physical Exam    Neuro :  no focal deficits  Respiratory: CTA B/L  CV: RRR, S1S2, no murmurs,   Abdominal: Soft, NT, ND +BS,  Extremities: No edema, + peripheral pulses    ASSESSMENT    alcoholic keto acidosis,   alcoholic pancreatitis,   alcohol abuse,   edvin, h/o EtOH abuse and tobacco dependence    Other acidosis    No pertinent past medical history    No significant past surgical history        PLAN    admit to med ,   bicarb drip,   librium as hosp protocol,   ativan as per [facility] protocol,   folic acid,   thiamine,   multivitamin,   cont preadmit home meds,   gi and dvt prophylaxis     cbc,   bmp,   mg,   phos,   lipids,   tsh,   bld cx,   ua,   ucx,        icu cons   soc work cons      Patient is a 43y old  Male who presents with a chief complaint of Pancreatitis, Alcoholic Ketosis (07 May 2023 15:35)    pt seen in icu [ x ], reg med floor [   ], bed [ x ], chair at bedside [   ], awake and responsive [  ], somnolent but arousable  [ x ],  nad [ x ]      Allergies    No Known Allergies        Vitals    T(F): 98.7 (05-08-23 @ 06:00), Max: 100.4 (05-08-23 @ 00:00)  HR: 114 (05-08-23 @ 06:00) (95 - 150)  BP: 113/79 (05-08-23 @ 06:00) (71/46 - 133/84)  RR: 22 (05-08-23 @ 06:00) (16 - 34)  SpO2: 99% (05-08-23 @ 06:00) (96% - 100%)  Wt(kg): --  CAPILLARY BLOOD GLUCOSE      POCT Blood Glucose.: 178 mg/dL (08 May 2023 06:05)      Labs                          10.1   9.77  )-----------( 59       ( 08 May 2023 05:03 )             28.8       05-08    x   |  x   |  x   ----------------------------<  x   x    |  x   |  1.04    Ca    7.4<L>      07 May 2023 20:00  Phos  1.1     05-08  Mg     1.6     05-08    TPro  6.4  /  Alb  3.5  /  TBili  1.0  /  DBili  x   /  AST  78<H>  /  ALT  47  /  AlkPhos  79  05-07      CARDIAC MARKERS ( 07 May 2023 14:10 )  x     / x     / 120 U/L / x     / 3.8 ng/mL      Troponin I, High Sensitivity Result: 8.6 ng/L (05-07-23 @ 14:10)        Radiology Results      Meds    MEDICATIONS  (STANDING):  chlorhexidine 2% Cloths 1 Application(s) Topical <User Schedule>  dextrose 5% + lactated ringers. 1000 milliLiter(s) (200 mL/Hr) IV Continuous <Continuous>  folic acid 1 milliGRAM(s) Oral daily  heparin   Injectable 5000 Unit(s) SubCutaneous every 8 hours  LORazepam   Injectable 2 milliGRAM(s) IV Push every 4 hours  multivitamin 1 Tablet(s) Oral daily  pantoprazole  Injectable 40 milliGRAM(s) IV Push daily  thiamine IVPB 500 milliGRAM(s) IV Intermittent daily      MEDICATIONS  (PRN):  acetaminophen     Tablet .. 650 milliGRAM(s) Oral every 6 hours PRN Temp greater or equal to 38C (100.4F), Mild Pain (1 - 3)  HYDROmorphone  Injectable 1 milliGRAM(s) IV Push every 6 hours PRN Severe Pain (7 - 10)  LORazepam   Injectable 1 milliGRAM(s) IV Push every 1 hour PRN Symptom-triggered: 2 point increase in CIWA -Ar score and a total score of 7 or LESS  traMADol 25 milliGRAM(s) Oral every 6 hours PRN Moderate Pain (4 - 6)      Physical Exam    Neuro :  no focal deficits  Respiratory: CTA B/L  CV: RRR, S1S2, no murmurs,   Abdominal: Soft, NT, ND +BS,  Extremities: No edema, + peripheral pulses    ASSESSMENT    alcoholic keto acidosis,   alcoholic pancreatitis,   alcohol abuse,   edvin,   h/o EtOH abuse   tobacco dependence          PLAN    ,   pt now off bicarb drip,   ativan as per [facility] protocol,   folic acid, thiamine, multivitamin,   f/u ct abd pelv   serum creat wnl   cont protonix    cont current meds   mgmt as per icu

## 2023-05-08 NOTE — PROGRESS NOTE ADULT - SUBJECTIVE AND OBJECTIVE BOX
Patient is a 43y old  Male who presents with a chief complaint of Pancreatitis, Alcoholic Ketosis (08 May 2023 12:33)    INTERVAL HISTORY/ OVERNIGHT EVENTS: Patient was examined while he was lying in bed, pt is Aox3, responding to questions/commands, in NAD. He denies headache, weakness, cough, chest pain, wheezing, abdominal pain. Pt is having bowel movements. Is not visibly in withdrawals.   PRESSORS: [ ] YES [ ] NO  WHICH:    ANTIBIOTICS:                  DATE STARTED:  ANTIBIOTICS:                  DATE STARTED:  ANTIBIOTICS:                  DATE STARTED:    Antimicrobial:    Cardiovascular:    Pulmonary:    Hematalogic:  heparin   Injectable 5000 Unit(s) SubCutaneous every 8 hours    Other:  acetaminophen     Tablet .. 650 milliGRAM(s) Oral every 6 hours PRN  chlorhexidine 2% Cloths 1 Application(s) Topical <User Schedule>  dextrose 5% + lactated ringers. 1000 milliLiter(s) IV Continuous <Continuous>  folic acid 1 milliGRAM(s) Oral daily  HYDROmorphone  Injectable 1 milliGRAM(s) IV Push every 6 hours PRN  multivitamin 1 Tablet(s) Oral daily  pantoprazole  Injectable 40 milliGRAM(s) IV Push daily  potassium phosphate / sodium phosphate Powder (PHOS-NaK) 1 Packet(s) Oral once  potassium phosphate IVPB 30 milliMole(s) IV Intermittent every 6 hours  thiamine IVPB 500 milliGRAM(s) IV Intermittent daily  traMADol 25 milliGRAM(s) Oral every 6 hours PRN    acetaminophen     Tablet .. 650 milliGRAM(s) Oral every 6 hours PRN  chlorhexidine 2% Cloths 1 Application(s) Topical <User Schedule>  dextrose 5% + lactated ringers. 1000 milliLiter(s) IV Continuous <Continuous>  folic acid 1 milliGRAM(s) Oral daily  heparin   Injectable 5000 Unit(s) SubCutaneous every 8 hours  HYDROmorphone  Injectable 1 milliGRAM(s) IV Push every 6 hours PRN  multivitamin 1 Tablet(s) Oral daily  pantoprazole  Injectable 40 milliGRAM(s) IV Push daily  potassium phosphate / sodium phosphate Powder (PHOS-NaK) 1 Packet(s) Oral once  potassium phosphate IVPB 30 milliMole(s) IV Intermittent every 6 hours  thiamine IVPB 500 milliGRAM(s) IV Intermittent daily  traMADol 25 milliGRAM(s) Oral every 6 hours PRN    Drug Dosing Weight  Height (cm): 182.9 (07 May 2023 10:43)  Weight (kg): 74.9 (07 May 2023 16:18)  BMI (kg/m2): 22.4 (07 May 2023 16:18)  BSA (m2): 1.96 (07 May 2023 16:18)    CENTRAL LINE: [ ] YES [ ] NO  LOCATION:     PRATER: [ ] YES [ ] NO      A-LINE:  [ ] YES [ ] NO  LOCATION:         ICU Vital Signs Last 24 Hrs  T(C): 36.8 (08 May 2023 07:00), Max: 38 (08 May 2023 00:00)  T(F): 98.2 (08 May 2023 07:00), Max: 100.4 (08 May 2023 00:00)  HR: 101 (08 May 2023 12:00) (96 - 150)  BP: 133/83 (08 May 2023 12:00) (100/71 - 133/84)  BP(mean): 95 (08 May 2023 12:00) (78 - 103)  ABP: --  ABP(mean): --  RR: 27 (08 May 2023 12:00) (17 - 34)  SpO2: 100% (08 May 2023 12:00) (96% - 100%)    O2 Parameters below as of 07 May 2023 19:00  Patient On (Oxygen Delivery Method): room air            ABG - ( 07 May 2023 18:04 )  pH, Arterial: 7.32  pH, Blood: x     /  pCO2: 24    /  pO2: 102   / HCO3: 12    / Base Excess: -11.8 /  SaO2: 100                   05-07 @ 07:01  -  05-08 @ 07:00  --------------------------------------------------------  IN: 3450 mL / OUT: 1250 mL / NET: 2200 mL            PHYSICAL EXAM:    GENERAL: NAD, generalized weakness   EYES: EOMI, PERRLA,   NECK: Supple, No JVD; Normal thyroid; Trachea midline  NERVOUS SYSTEM:  Alert & Oriented X3,  Motor Strength 5/5 B/L upper and lower extremities; DTRs 2+ intact and symmetric  CHEST/LUNG: No rales, rhonchi, wheezing   HEART: Regular rate and rhythm; No murmurs,   ABDOMEN: Soft, Nontender, Nondistended; Bowel sounds present  EXTREMITIES:  2+ Peripheral Pulses, No clubbing, cyanosis, or edema      LABS:                        10.1   9.77  )-----------( 59       ( 08 May 2023 05:03 )             28.8     05-08    134<L>  |  100  |  13  ----------------------------<  204<H>  3.5   |  25  |  1.04    Ca    8.0<L>      08 May 2023 05:03  Phos  0.5     05-08  Mg     1.6     05-08    TPro  6.4  /  Alb  3.5  /  TBili  1.0  /  DBili  x   /  AST  78<H>  /  ALT  47  /  AlkPhos  79  05-07      Urinalysis Basic - ( 07 May 2023 15:50 )    Color: Yellow / Appearance: Slightly Turbid / S.020 / pH: x  Gluc: x / Ketone: Large  / Bili: Negative / Urobili: Negative   Blood: x / Protein: 30 mg/dL / Nitrite: Negative   Leuk Esterase: Negative / RBC: 2-5 /HPF / WBC 0-2 /HPF   Sq Epi: x / Non Sq Epi: x / Bacteria: Few /HPF      CAPILLARY BLOOD GLUCOSE      POCT Blood Glucose.: 140 mg/dL (08 May 2023 11:36)  POCT Blood Glucose.: 178 mg/dL (08 May 2023 06:05)  POCT Blood Glucose.: 172 mg/dL (08 May 2023 00:26)  POCT Blood Glucose.: 75 mg/dL (07 May 2023 23:30)        RADIOLOGY & ADDITIONAL STUDIES REVIEWED:  ***     Patient is a 43y old  Male who presents with a chief complaint of Pancreatitis, Alcoholic Ketosis (08 May 2023 12:33)    INTERVAL HISTORY/ OVERNIGHT EVENTS: Patient was examined while he was lying in bed with  Ap ID [id number], pt is Aox3, responding to questions/commands, in NAD. He denies headache, weakness, cough, chest pain, wheezing, abdominal pain. Pt is having bowel movements. Is not visibly in withdrawals.   PRESSORS: [ ] YES [ ] NO  WHICH:    ANTIBIOTICS:                  DATE STARTED:  ANTIBIOTICS:                  DATE STARTED:  ANTIBIOTICS:                  DATE STARTED:    Antimicrobial:    Cardiovascular:    Pulmonary:    Hematalogic:  heparin   Injectable 5000 Unit(s) SubCutaneous every 8 hours    Other:  acetaminophen     Tablet .. 650 milliGRAM(s) Oral every 6 hours PRN  chlorhexidine 2% Cloths 1 Application(s) Topical <User Schedule>  dextrose 5% + lactated ringers. 1000 milliLiter(s) IV Continuous <Continuous>  folic acid 1 milliGRAM(s) Oral daily  HYDROmorphone  Injectable 1 milliGRAM(s) IV Push every 6 hours PRN  multivitamin 1 Tablet(s) Oral daily  pantoprazole  Injectable 40 milliGRAM(s) IV Push daily  potassium phosphate / sodium phosphate Powder (PHOS-NaK) 1 Packet(s) Oral once  potassium phosphate IVPB 30 milliMole(s) IV Intermittent every 6 hours  thiamine IVPB 500 milliGRAM(s) IV Intermittent daily  traMADol 25 milliGRAM(s) Oral every 6 hours PRN    acetaminophen     Tablet .. 650 milliGRAM(s) Oral every 6 hours PRN  chlorhexidine 2% Cloths 1 Application(s) Topical <User Schedule>  dextrose 5% + lactated ringers. 1000 milliLiter(s) IV Continuous <Continuous>  folic acid 1 milliGRAM(s) Oral daily  heparin   Injectable 5000 Unit(s) SubCutaneous every 8 hours  HYDROmorphone  Injectable 1 milliGRAM(s) IV Push every 6 hours PRN  multivitamin 1 Tablet(s) Oral daily  pantoprazole  Injectable 40 milliGRAM(s) IV Push daily  potassium phosphate / sodium phosphate Powder (PHOS-NaK) 1 Packet(s) Oral once  potassium phosphate IVPB 30 milliMole(s) IV Intermittent every 6 hours  thiamine IVPB 500 milliGRAM(s) IV Intermittent daily  traMADol 25 milliGRAM(s) Oral every 6 hours PRN    Drug Dosing Weight  Height (cm): 182.9 (07 May 2023 10:43)  Weight (kg): 74.9 (07 May 2023 16:18)  BMI (kg/m2): 22.4 (07 May 2023 16:18)  BSA (m2): 1.96 (07 May 2023 16:18)    CENTRAL LINE: [ ] YES [ ] NO  LOCATION:     PRATER: [ ] YES [ ] NO      A-LINE:  [ ] YES [ ] NO  LOCATION:         ICU Vital Signs Last 24 Hrs  T(C): 36.8 (08 May 2023 07:00), Max: 38 (08 May 2023 00:00)  T(F): 98.2 (08 May 2023 07:00), Max: 100.4 (08 May 2023 00:00)  HR: 101 (08 May 2023 12:00) (96 - 150)  BP: 133/83 (08 May 2023 12:00) (100/71 - 133/84)  BP(mean): 95 (08 May 2023 12:00) (78 - 103)  ABP: --  ABP(mean): --  RR: 27 (08 May 2023 12:00) (17 - 34)  SpO2: 100% (08 May 2023 12:00) (96% - 100%)    O2 Parameters below as of 07 May 2023 19:00  Patient On (Oxygen Delivery Method): room air            ABG - ( 07 May 2023 18:04 )  pH, Arterial: 7.32  pH, Blood: x     /  pCO2: 24    /  pO2: 102   / HCO3: 12    / Base Excess: -11.8 /  SaO2: 100                   - @ 07:01  -  08 @ 07:00  --------------------------------------------------------  IN: 3450 mL / OUT: 1250 mL / NET: 2200 mL            PHYSICAL EXAM:    GENERAL: NAD, generalized weakness   EYES: EOMI, PERRLA,   NECK: Supple, No JVD; Normal thyroid; Trachea midline  NERVOUS SYSTEM:  Alert & Oriented X3,  Motor Strength 5/5 B/L upper and lower extremities; DTRs 2+ intact and symmetric  CHEST/LUNG: No rales, rhonchi, wheezing   HEART: Regular rate and rhythm; No murmurs,   ABDOMEN: Soft, Nontender, Nondistended; Bowel sounds present  EXTREMITIES:  2+ Peripheral Pulses, No clubbing, cyanosis, or edema      LABS:                        10.1   9.77  )-----------( 59       ( 08 May 2023 05:03 )             28.8     05-08    134<L>  |  100  |  13  ----------------------------<  204<H>  3.5   |  25  |  1.04    Ca    8.0<L>      08 May 2023 05:03  Phos  0.5     05-08  Mg     1.6     05-08    TPro  6.4  /  Alb  3.5  /  TBili  1.0  /  DBili  x   /  AST  78<H>  /  ALT  47  /  AlkPhos  79  05-07      Urinalysis Basic - ( 07 May 2023 15:50 )    Color: Yellow / Appearance: Slightly Turbid / S.020 / pH: x  Gluc: x / Ketone: Large  / Bili: Negative / Urobili: Negative   Blood: x / Protein: 30 mg/dL / Nitrite: Negative   Leuk Esterase: Negative / RBC: 2-5 /HPF / WBC 0-2 /HPF   Sq Epi: x / Non Sq Epi: x / Bacteria: Few /HPF      CAPILLARY BLOOD GLUCOSE      POCT Blood Glucose.: 140 mg/dL (08 May 2023 11:36)  POCT Blood Glucose.: 178 mg/dL (08 May 2023 06:05)  POCT Blood Glucose.: 172 mg/dL (08 May 2023 00:26)  POCT Blood Glucose.: 75 mg/dL (07 May 2023 23:30)        RADIOLOGY & ADDITIONAL STUDIES REVIEWED:  ***

## 2023-05-09 LAB
ALBUMIN SERPL ELPH-MCNC: 2.4 G/DL — LOW (ref 3.5–5)
ALBUMIN SERPL ELPH-MCNC: 2.7 G/DL — LOW (ref 3.5–5)
ALBUMIN SERPL ELPH-MCNC: 2.7 G/DL — LOW (ref 3.5–5)
ALP SERPL-CCNC: 67 U/L — SIGNIFICANT CHANGE UP (ref 40–120)
ALP SERPL-CCNC: 74 U/L — SIGNIFICANT CHANGE UP (ref 40–120)
ALP SERPL-CCNC: 79 U/L — SIGNIFICANT CHANGE UP (ref 40–120)
ALT FLD-CCNC: 35 U/L DA — SIGNIFICANT CHANGE UP (ref 10–60)
ALT FLD-CCNC: 41 U/L DA — SIGNIFICANT CHANGE UP (ref 10–60)
ALT FLD-CCNC: 57 U/L DA — SIGNIFICANT CHANGE UP (ref 10–60)
ANION GAP SERPL CALC-SCNC: 3 MMOL/L — LOW (ref 5–17)
ANION GAP SERPL CALC-SCNC: 6 MMOL/L — SIGNIFICANT CHANGE UP (ref 5–17)
ANION GAP SERPL CALC-SCNC: 6 MMOL/L — SIGNIFICANT CHANGE UP (ref 5–17)
ANION GAP SERPL CALC-SCNC: 7 MMOL/L — SIGNIFICANT CHANGE UP (ref 5–17)
APTT BLD: 42.3 SEC — HIGH (ref 27.5–35.5)
AST SERPL-CCNC: 100 U/L — HIGH (ref 10–40)
AST SERPL-CCNC: 181 U/L — HIGH (ref 10–40)
AST SERPL-CCNC: 78 U/L — HIGH (ref 10–40)
BASOPHILS # BLD AUTO: 0.01 K/UL — SIGNIFICANT CHANGE UP (ref 0–0.2)
BASOPHILS NFR BLD AUTO: 0.1 % — SIGNIFICANT CHANGE UP (ref 0–2)
BILIRUB SERPL-MCNC: 1.1 MG/DL — SIGNIFICANT CHANGE UP (ref 0.2–1.2)
BILIRUB SERPL-MCNC: 1.2 MG/DL — SIGNIFICANT CHANGE UP (ref 0.2–1.2)
BILIRUB SERPL-MCNC: 1.4 MG/DL — HIGH (ref 0.2–1.2)
BUN SERPL-MCNC: 2 MG/DL — LOW (ref 7–18)
BUN SERPL-MCNC: 3 MG/DL — LOW (ref 7–18)
BUN SERPL-MCNC: 3 MG/DL — LOW (ref 7–18)
BUN SERPL-MCNC: 4 MG/DL — LOW (ref 7–18)
CALCIUM SERPL-MCNC: 7.4 MG/DL — LOW (ref 8.4–10.5)
CALCIUM SERPL-MCNC: 7.8 MG/DL — LOW (ref 8.4–10.5)
CALCIUM SERPL-MCNC: 8 MG/DL — LOW (ref 8.4–10.5)
CALCIUM SERPL-MCNC: 8.2 MG/DL — LOW (ref 8.4–10.5)
CHLORIDE SERPL-SCNC: 100 MMOL/L — SIGNIFICANT CHANGE UP (ref 96–108)
CHLORIDE SERPL-SCNC: 102 MMOL/L — SIGNIFICANT CHANGE UP (ref 96–108)
CHLORIDE SERPL-SCNC: 103 MMOL/L — SIGNIFICANT CHANGE UP (ref 96–108)
CHLORIDE SERPL-SCNC: 103 MMOL/L — SIGNIFICANT CHANGE UP (ref 96–108)
CO2 SERPL-SCNC: 28 MMOL/L — SIGNIFICANT CHANGE UP (ref 22–31)
CO2 SERPL-SCNC: 30 MMOL/L — SIGNIFICANT CHANGE UP (ref 22–31)
CO2 SERPL-SCNC: 30 MMOL/L — SIGNIFICANT CHANGE UP (ref 22–31)
CO2 SERPL-SCNC: 31 MMOL/L — SIGNIFICANT CHANGE UP (ref 22–31)
CREAT SERPL-MCNC: 0.35 MG/DL — LOW (ref 0.5–1.3)
CREAT SERPL-MCNC: 0.39 MG/DL — LOW (ref 0.5–1.3)
CREAT SERPL-MCNC: 0.45 MG/DL — LOW (ref 0.5–1.3)
EGFR: 135 ML/MIN/1.73M2 — SIGNIFICANT CHANGE UP
EGFR: 141 ML/MIN/1.73M2 — SIGNIFICANT CHANGE UP
EGFR: 145 ML/MIN/1.73M2 — SIGNIFICANT CHANGE UP
EOSINOPHIL # BLD AUTO: 0.05 K/UL — SIGNIFICANT CHANGE UP (ref 0–0.5)
EOSINOPHIL NFR BLD AUTO: 0.6 % — SIGNIFICANT CHANGE UP (ref 0–6)
ETHYLENE GLYCOL SERPLBLD-MCNC: <5 MG/DL — SIGNIFICANT CHANGE UP
GLUCOSE SERPL-MCNC: 100 MG/DL — HIGH (ref 70–99)
GLUCOSE SERPL-MCNC: 111 MG/DL — HIGH (ref 70–99)
GLUCOSE SERPL-MCNC: 119 MG/DL — HIGH (ref 70–99)
GLUCOSE SERPL-MCNC: 95 MG/DL — SIGNIFICANT CHANGE UP (ref 70–99)
HCT VFR BLD CALC: 28.3 % — LOW (ref 39–50)
HCT VFR BLD CALC: 29.7 % — LOW (ref 39–50)
HGB BLD-MCNC: 10 G/DL — LOW (ref 13–17)
HGB BLD-MCNC: 10.4 G/DL — LOW (ref 13–17)
IMM GRANULOCYTES NFR BLD AUTO: 1.2 % — HIGH (ref 0–0.9)
INR BLD: 1.32 RATIO — HIGH (ref 0.88–1.16)
LYMPHOCYTES # BLD AUTO: 0.69 K/UL — LOW (ref 1–3.3)
LYMPHOCYTES # BLD AUTO: 8.1 % — LOW (ref 13–44)
MAGNESIUM SERPL-MCNC: 2.6 MG/DL — SIGNIFICANT CHANGE UP (ref 1.6–2.6)
MAGNESIUM SERPL-MCNC: 2.8 MG/DL — HIGH (ref 1.6–2.6)
MAGNESIUM SERPL-MCNC: 3.5 MG/DL — HIGH (ref 1.6–2.6)
MCHC RBC-ENTMCNC: 28.5 PG — SIGNIFICANT CHANGE UP (ref 27–34)
MCHC RBC-ENTMCNC: 29 PG — SIGNIFICANT CHANGE UP (ref 27–34)
MCHC RBC-ENTMCNC: 35 GM/DL — SIGNIFICANT CHANGE UP (ref 32–36)
MCHC RBC-ENTMCNC: 35.3 GM/DL — SIGNIFICANT CHANGE UP (ref 32–36)
MCV RBC AUTO: 81.4 FL — SIGNIFICANT CHANGE UP (ref 80–100)
MCV RBC AUTO: 82 FL — SIGNIFICANT CHANGE UP (ref 80–100)
METHANOL BLD-MCNC: <.01 G/DL — SIGNIFICANT CHANGE UP (ref 0–0.01)
MONOCYTES # BLD AUTO: 0.56 K/UL — SIGNIFICANT CHANGE UP (ref 0–0.9)
MONOCYTES NFR BLD AUTO: 6.6 % — SIGNIFICANT CHANGE UP (ref 2–14)
NEUTROPHILS # BLD AUTO: 7.1 K/UL — SIGNIFICANT CHANGE UP (ref 1.8–7.4)
NEUTROPHILS NFR BLD AUTO: 83.4 % — HIGH (ref 43–77)
NRBC # BLD: 0 /100 WBCS — SIGNIFICANT CHANGE UP (ref 0–0)
NRBC # BLD: 0 /100 WBCS — SIGNIFICANT CHANGE UP (ref 0–0)
PHOSPHATE SERPL-MCNC: 1.9 MG/DL — LOW (ref 2.5–4.5)
PHOSPHATE SERPL-MCNC: 13.9 MG/DL — HIGH (ref 2.5–4.5)
PHOSPHATE SERPL-MCNC: 7.3 MG/DL — HIGH (ref 2.5–4.5)
PLATELET # BLD AUTO: 44 K/UL — LOW (ref 150–400)
PLATELET # BLD AUTO: 55 K/UL — LOW (ref 150–400)
POTASSIUM SERPL-MCNC: 2.9 MMOL/L — CRITICAL LOW (ref 3.5–5.3)
POTASSIUM SERPL-MCNC: 3.2 MMOL/L — LOW (ref 3.5–5.3)
POTASSIUM SERPL-MCNC: 5.4 MMOL/L — HIGH (ref 3.5–5.3)
POTASSIUM SERPL-MCNC: 9 MMOL/L — CRITICAL HIGH (ref 3.5–5.3)
POTASSIUM SERPL-SCNC: 2.9 MMOL/L — CRITICAL LOW (ref 3.5–5.3)
POTASSIUM SERPL-SCNC: 3.2 MMOL/L — LOW (ref 3.5–5.3)
POTASSIUM SERPL-SCNC: 5.4 MMOL/L — HIGH (ref 3.5–5.3)
POTASSIUM SERPL-SCNC: 9 MMOL/L — CRITICAL HIGH (ref 3.5–5.3)
PROT SERPL-MCNC: 4.8 G/DL — LOW (ref 6–8.3)
PROT SERPL-MCNC: 5.3 G/DL — LOW (ref 6–8.3)
PROT SERPL-MCNC: 5.4 G/DL — LOW (ref 6–8.3)
PROTHROM AB SERPL-ACNC: 15.7 SEC — HIGH (ref 10.5–13.4)
RBC # BLD: 3.45 M/UL — LOW (ref 4.2–5.8)
RBC # BLD: 3.65 M/UL — LOW (ref 4.2–5.8)
RBC # FLD: 16.2 % — HIGH (ref 10.3–14.5)
RBC # FLD: 16.3 % — HIGH (ref 10.3–14.5)
SODIUM SERPL-SCNC: 136 MMOL/L — SIGNIFICANT CHANGE UP (ref 135–145)
SODIUM SERPL-SCNC: 137 MMOL/L — SIGNIFICANT CHANGE UP (ref 135–145)
SODIUM SERPL-SCNC: 137 MMOL/L — SIGNIFICANT CHANGE UP (ref 135–145)
SODIUM SERPL-SCNC: 139 MMOL/L — SIGNIFICANT CHANGE UP (ref 135–145)
WBC # BLD: 8.16 K/UL — SIGNIFICANT CHANGE UP (ref 3.8–10.5)
WBC # BLD: 8.51 K/UL — SIGNIFICANT CHANGE UP (ref 3.8–10.5)
WBC # FLD AUTO: 8.16 K/UL — SIGNIFICANT CHANGE UP (ref 3.8–10.5)
WBC # FLD AUTO: 8.51 K/UL — SIGNIFICANT CHANGE UP (ref 3.8–10.5)

## 2023-05-09 PROCEDURE — 99233 SBSQ HOSP IP/OBS HIGH 50: CPT | Mod: GC

## 2023-05-09 RX ORDER — MAGNESIUM SULFATE 500 MG/ML
2 VIAL (ML) INJECTION
Refills: 0 | Status: COMPLETED | OUTPATIENT
Start: 2023-05-09 | End: 2023-05-09

## 2023-05-09 RX ORDER — POTASSIUM PHOSPHATE, MONOBASIC POTASSIUM PHOSPHATE, DIBASIC 236; 224 MG/ML; MG/ML
30 INJECTION, SOLUTION INTRAVENOUS ONCE
Refills: 0 | Status: COMPLETED | OUTPATIENT
Start: 2023-05-09 | End: 2023-05-09

## 2023-05-09 RX ORDER — POTASSIUM CHLORIDE 20 MEQ
10 PACKET (EA) ORAL
Refills: 0 | Status: COMPLETED | OUTPATIENT
Start: 2023-05-09 | End: 2023-05-09

## 2023-05-09 RX ORDER — PHENOBARBITAL 60 MG
130 TABLET ORAL ONCE
Refills: 0 | Status: DISCONTINUED | OUTPATIENT
Start: 2023-05-09 | End: 2023-05-09

## 2023-05-09 RX ORDER — DILTIAZEM HCL 120 MG
10 CAPSULE, EXT RELEASE 24 HR ORAL ONCE
Refills: 0 | Status: COMPLETED | OUTPATIENT
Start: 2023-05-09 | End: 2023-05-09

## 2023-05-09 RX ORDER — SODIUM,POTASSIUM PHOSPHATES 278-250MG
1 POWDER IN PACKET (EA) ORAL ONCE
Refills: 0 | Status: COMPLETED | OUTPATIENT
Start: 2023-05-09 | End: 2023-05-09

## 2023-05-09 RX ORDER — PHENOBARBITAL 60 MG
260 TABLET ORAL ONCE
Refills: 0 | Status: DISCONTINUED | OUTPATIENT
Start: 2023-05-09 | End: 2023-05-09

## 2023-05-09 RX ORDER — PANTOPRAZOLE SODIUM 20 MG/1
40 TABLET, DELAYED RELEASE ORAL DAILY
Refills: 0 | Status: DISCONTINUED | OUTPATIENT
Start: 2023-05-09 | End: 2023-05-10

## 2023-05-09 RX ADMIN — Medication 1 PACKET(S): at 05:18

## 2023-05-09 RX ADMIN — Medication 408 MILLIGRAM(S): at 22:05

## 2023-05-09 RX ADMIN — Medication 100 MILLIEQUIVALENT(S): at 00:20

## 2023-05-09 RX ADMIN — Medication 2 MILLIGRAM(S): at 20:58

## 2023-05-09 RX ADMIN — Medication 25 GRAM(S): at 01:39

## 2023-05-09 RX ADMIN — HEPARIN SODIUM 5000 UNIT(S): 5000 INJECTION INTRAVENOUS; SUBCUTANEOUS at 05:18

## 2023-05-09 RX ADMIN — Medication 1 PACKET(S): at 00:19

## 2023-05-09 RX ADMIN — Medication 1 PACKET(S): at 12:35

## 2023-05-09 RX ADMIN — MUPIROCIN 1 APPLICATION(S): 20 OINTMENT TOPICAL at 18:52

## 2023-05-09 RX ADMIN — MUPIROCIN 1 APPLICATION(S): 20 OINTMENT TOPICAL at 05:19

## 2023-05-09 RX ADMIN — Medication 100 MILLIEQUIVALENT(S): at 01:20

## 2023-05-09 RX ADMIN — Medication 25 GRAM(S): at 03:17

## 2023-05-09 RX ADMIN — Medication 10 MILLIGRAM(S): at 06:09

## 2023-05-09 RX ADMIN — Medication 1 TABLET(S): at 11:24

## 2023-05-09 RX ADMIN — PANTOPRAZOLE SODIUM 40 MILLIGRAM(S): 20 TABLET, DELAYED RELEASE ORAL at 11:24

## 2023-05-09 RX ADMIN — Medication 2 MILLIGRAM(S): at 03:11

## 2023-05-09 RX ADMIN — Medication 2 MILLIGRAM(S): at 19:38

## 2023-05-09 RX ADMIN — Medication 650 MILLIGRAM(S): at 00:00

## 2023-05-09 RX ADMIN — CHLORHEXIDINE GLUCONATE 1 APPLICATION(S): 213 SOLUTION TOPICAL at 05:19

## 2023-05-09 RX ADMIN — Medication 105 MILLIGRAM(S): at 11:24

## 2023-05-09 RX ADMIN — Medication 130 MILLIGRAM(S): at 23:22

## 2023-05-09 RX ADMIN — Medication 1 MILLIGRAM(S): at 11:25

## 2023-05-09 RX ADMIN — Medication 100 MILLIEQUIVALENT(S): at 03:17

## 2023-05-09 RX ADMIN — POTASSIUM PHOSPHATE, MONOBASIC POTASSIUM PHOSPHATE, DIBASIC 83.33 MILLIMOLE(S): 236; 224 INJECTION, SOLUTION INTRAVENOUS at 02:01

## 2023-05-09 RX ADMIN — Medication 100 MILLIEQUIVALENT(S): at 02:31

## 2023-05-09 NOTE — DIETITIAN INITIAL EVALUATION ADULT - PERTINENT LABORATORY DATA
05-09    136  |  100  |  3<L>  ----------------------------<  111<H>  3.2<L>   |  30  |  0.45<L>    Ca    8.0<L>      09 May 2023 11:15  Phos  1.9     05-09  Mg     3.5     05-09    TPro  5.4<L>  /  Alb  2.7<L>  /  TBili  1.4<H>  /  DBili  x   /  AST  181<H>  /  ALT  57  /  AlkPhos  79  05-09  POCT Blood Glucose.: 97 mg/dL (05-09-23 @ 11:12)

## 2023-05-09 NOTE — CONSULT NOTE ADULT - ATTENDING COMMENTS
43M with PMHx of EtOH abuse p/w abdominal pain, nausea and vomiting. Found to have acute pancreatitis. ICU Consulted for severe lactic acidosis with HAGMA.     Assessment:  1. Acute pancreatitis  2. Lactic acidosis  3. HAGMA   4. Acute renal failure  5. Alcohol abuse disorder     Plan:  - Patient interviewed at bedside, refused language line . Self interpreted in Irish.   - Admits to drinking about half a litre of Vodka yesterday which lead to multiple episodes of vomiting and abdominal pain  - Denies any other drug use   - IV fluid hydration. bicarb supplementation to reach bicarb > 15  - Trend lactate  - NPO for now  - Check osmolal gaps  - Check urine studies   - R/o toxic alcohol ingestion, though patient denies it   - Serial BMP  - Urine output measurement   - High dose thiamine, and folate supplementation   - Monitor for signs of alcohol withdrawal  - Admit to ICU for closer observation and further management
PAtient seen and examined with resident and agree with above    Plan:  Back to ICU  Start Phenobarb  may need Precedex as well

## 2023-05-09 NOTE — CONSULT NOTE ADULT - SUBJECTIVE AND OBJECTIVE BOX
Patient is a 43y old  Male who presents with a chief complaint of Pancreatitis, Alcoholic Ketosis (09 May 2023 12:38)      HPI:  43 year old male with PMHx EtOH abuse and tobacco dependence presents with abdominal pain. History obtained from cousin at bedside, states that patient has a prolonged history of EtOH abuse and has been sober for 25 days before relapsing recently after getting money from a new job. States that patient has had nonstop nausea and vomiting over the past 3 days and has not eaten in over a week. Patient reportedly drinks 250ml vodka at least once a day, last drink was yesterday evening. Patient and cousin deny use of any other substances aside from tobacco use. NKDA (07 May 2023 13:56)      PAST MEDICAL & SURGICAL HISTORY:  No pertinent past medical history      No significant past surgical history          SOCIAL HX:   Smoking                         ETOH                            Other    FAMILY HISTORY:  FH: alcohol abuse (Father)    :  No known cardiovacular family hisotry     ROS:  See HPI     Allergies    No Known Allergies    Intolerances          PHYSICAL EXAM    ICU Vital Signs Last 24 Hrs  T(C): 36.9 (09 May 2023 20:36), Max: 37.8 (09 May 2023 12:00)  T(F): 98.5 (09 May 2023 20:36), Max: 100 (09 May 2023 12:00)  HR: 125 (09 May 2023 20:36) (101 - 162)  BP: 145/87 (09 May 2023 20:36) (98/83 - 145/87)  BP(mean): 108 (09 May 2023 16:00) (79 - 112)  ABP: --  ABP(mean): --  RR: 19 (09 May 2023 20:36) (17 - 34)  SpO2: 100% (09 May 2023 20:36) (81% - 100%)    O2 Parameters below as of 09 May 2023 20:36  Patient On (Oxygen Delivery Method): room air            General: Not in distress  HEENT:  GIO              Lymphatic system: No LN  Lungs: Bilateral BS  Cardiovascular: Regular  Gastrointestinal: Soft, Positive BS  Musculoskeletal: No clubbing.  Moves all extremities.    Skin: Warm.  Intact  Neurological: No motor or sensory deficit       05-08-23 @ 07:01  -  05-09-23 @ 07:00  --------------------------------------------------------  IN:    dextrose 5% + lactated ringers: 600 mL    dextrose 5% + sodium chloride 0.9%: 990 mL    IV PiggyBack: 500 mL    IV PiggyBack: 400 mL    IV PiggyBack: 100 mL    Oral Fluid: 240 mL  Total IN: 2830 mL    OUT:    Voided (mL): 350 mL  Total OUT: 350 mL    Total NET: 2480 mL      05-09-23 @ 07:01  -  05-09-23 @ 21:34  --------------------------------------------------------  IN:    Oral Fluid: 1200 mL  Total IN: 1200 mL    OUT:    Voided (mL): 450 mL  Total OUT: 450 mL    Total NET: 750 mL          LABS:                          10.4   8.51  )-----------( 55       ( 09 May 2023 11:15 )             29.7                                               05-09    136  |  100  |  3<L>  ----------------------------<  111<H>  3.2<L>   |  30  |  0.45<L>    Ca    8.0<L>      09 May 2023 11:15  Phos  1.9     05-09  Mg     3.5     05-09    TPro  5.4<L>  /  Alb  2.7<L>  /  TBili  1.4<H>  /  DBili  x   /  AST  181<H>  /  ALT  57  /  AlkPhos  79  05-09      PT/INR - ( 09 May 2023 05:15 )   PT: 15.7 sec;   INR: 1.32 ratio         PTT - ( 09 May 2023 05:15 )  PTT:42.3 sec                                                                                     LIVER FUNCTIONS - ( 09 May 2023 11:15 )  Alb: 2.7 g/dL / Pro: 5.4 g/dL / ALK PHOS: 79 U/L / ALT: 57 U/L DA / AST: 181 U/L / GGT: x                                                  Culture - Blood (collected 07 May 2023 16:00)  Source: .Blood Blood-Peripheral  Preliminary Report (08 May 2023 20:02):    No growth to date.    Culture - Urine (collected 07 May 2023 15:50)  Source: Clean Catch Clean Catch (Midstream)  Final Report (08 May 2023 14:49):    <10,000 CFU/mL Normal Urogenital Vera    Culture - Blood (collected 07 May 2023 15:50)  Source: .Blood Blood-Peripheral  Preliminary Report (08 May 2023 20:02):    No growth to date.                                                                                           CXR:    ECHO:    MEDICATIONS  (STANDING):  folic acid 1 milliGRAM(s) Oral daily  multivitamin 1 Tablet(s) Oral daily  mupirocin 2% Ointment 1 Application(s) Both Nostrils two times a day  thiamine IVPB 500 milliGRAM(s) IV Intermittent daily    MEDICATIONS  (PRN):  acetaminophen     Tablet .. 650 milliGRAM(s) Oral every 6 hours PRN Temp greater or equal to 38C (100.4F), Mild Pain (1 - 3)  HYDROmorphone  Injectable 1 milliGRAM(s) IV Push every 6 hours PRN Severe Pain (7 - 10)  LORazepam   Injectable 2 milliGRAM(s) IV Push every 4 hours PRN Agitation  traMADol 25 milliGRAM(s) Oral every 6 hours PRN Moderate Pain (4 - 6)         Patient is a 43y old  Male who presents with a chief complaint of Pancreatitis, Alcoholic Ketosis (09 May 2023 12:38)    HPI: 43 year old male with PMHx EtOH abuse and tobacco dependence presents with abdominal pain. History obtained from cousin at bedside, states that patient has a prolonged history of EtOH abuse and has been sober for 25 days before relapsing recently after getting money from a new job. States that patient has had nonstop nausea and vomiting over the past 3 days and has not eaten in over a week. Patient reportedly drinks 250ml vodka at least once a day, last drink was yesterday evening. Patient and cousin deny use of any other substances aside from tobacco use. NKDA (07 May 2023 13:56)      PAST MEDICAL & SURGICAL HISTORY:  No pertinent past medical history    No significant past surgical history    SOCIAL HX:   Smoking                         ETOH                            Other    FAMILY HISTORY:  FH: alcohol abuse (Father)    :  No known cardiovacular family hisotry     ROS:  See HPI     Allergies    No Known Allergies    Intolerances          PHYSICAL EXAM    ICU Vital Signs Last 24 Hrs  T(C): 36.9 (09 May 2023 20:36), Max: 37.8 (09 May 2023 12:00)  T(F): 98.5 (09 May 2023 20:36), Max: 100 (09 May 2023 12:00)  HR: 125 (09 May 2023 20:36) (101 - 162)  BP: 145/87 (09 May 2023 20:36) (98/83 - 145/87)  BP(mean): 108 (09 May 2023 16:00) (79 - 112)  ABP: --  ABP(mean): --  RR: 19 (09 May 2023 20:36) (17 - 34)  SpO2: 100% (09 May 2023 20:36) (81% - 100%)    O2 Parameters below as of 09 May 2023 20:36  Patient On (Oxygen Delivery Method): room air            General: Not in distress  HEENT:  GIO              Lymphatic system: No LN  Lungs: Bilateral BS  Cardiovascular: Regular  Gastrointestinal: Soft, Positive BS  Musculoskeletal: No clubbing.  Moves all extremities.    Skin: Warm.  Intact  Neurological: No motor or sensory deficit       05-08-23 @ 07:01  -  05-09-23 @ 07:00  --------------------------------------------------------  IN:    dextrose 5% + lactated ringers: 600 mL    dextrose 5% + sodium chloride 0.9%: 990 mL    IV PiggyBack: 500 mL    IV PiggyBack: 400 mL    IV PiggyBack: 100 mL    Oral Fluid: 240 mL  Total IN: 2830 mL    OUT:    Voided (mL): 350 mL  Total OUT: 350 mL    Total NET: 2480 mL      05-09-23 @ 07:01  -  05-09-23 @ 21:34  --------------------------------------------------------  IN:    Oral Fluid: 1200 mL  Total IN: 1200 mL    OUT:    Voided (mL): 450 mL  Total OUT: 450 mL    Total NET: 750 mL          LABS:                          10.4   8.51  )-----------( 55       ( 09 May 2023 11:15 )             29.7                                               05-09    136  |  100  |  3<L>  ----------------------------<  111<H>  3.2<L>   |  30  |  0.45<L>    Ca    8.0<L>      09 May 2023 11:15  Phos  1.9     05-09  Mg     3.5     05-09    TPro  5.4<L>  /  Alb  2.7<L>  /  TBili  1.4<H>  /  DBili  x   /  AST  181<H>  /  ALT  57  /  AlkPhos  79  05-09      PT/INR - ( 09 May 2023 05:15 )   PT: 15.7 sec;   INR: 1.32 ratio         PTT - ( 09 May 2023 05:15 )  PTT:42.3 sec                                                                                     LIVER FUNCTIONS - ( 09 May 2023 11:15 )  Alb: 2.7 g/dL / Pro: 5.4 g/dL / ALK PHOS: 79 U/L / ALT: 57 U/L DA / AST: 181 U/L / GGT: x                                                  Culture - Blood (collected 07 May 2023 16:00)  Source: .Blood Blood-Peripheral  Preliminary Report (08 May 2023 20:02):    No growth to date.    Culture - Urine (collected 07 May 2023 15:50)  Source: Clean Catch Clean Catch (Midstream)  Final Report (08 May 2023 14:49):    <10,000 CFU/mL Normal Urogenital Vera    Culture - Blood (collected 07 May 2023 15:50)  Source: .Blood Blood-Peripheral  Preliminary Report (08 May 2023 20:02):    No growth to date.                                                                                           CXR:    ECHO:    MEDICATIONS  (STANDING):  folic acid 1 milliGRAM(s) Oral daily  multivitamin 1 Tablet(s) Oral daily  mupirocin 2% Ointment 1 Application(s) Both Nostrils two times a day  thiamine IVPB 500 milliGRAM(s) IV Intermittent daily    MEDICATIONS  (PRN):  acetaminophen     Tablet .. 650 milliGRAM(s) Oral every 6 hours PRN Temp greater or equal to 38C (100.4F), Mild Pain (1 - 3)  HYDROmorphone  Injectable 1 milliGRAM(s) IV Push every 6 hours PRN Severe Pain (7 - 10)  LORazepam   Injectable 2 milliGRAM(s) IV Push every 4 hours PRN Agitation  traMADol 25 milliGRAM(s) Oral every 6 hours PRN Moderate Pain (4 - 6)

## 2023-05-09 NOTE — PROGRESS NOTE ADULT - SUBJECTIVE AND OBJECTIVE BOX
Patient is a 43y old  Male who presents with a chief complaint of Pancreatitis, Alcoholic Ketosis (09 May 2023 12:38)    INTERVAL HISTORY/ OVERNIGHT EVENTS no overnight events     PRESSORS: [ ] YES [ ] NO  WHICH:    ANTIBIOTICS:                  DATE STARTED:  ANTIBIOTICS:                  DATE STARTED:  ANTIBIOTICS:                  DATE STARTED:    Antimicrobial:    Cardiovascular:    Pulmonary:    Hematalogic:    Other:  acetaminophen     Tablet .. 650 milliGRAM(s) Oral every 6 hours PRN  chlorhexidine 2% Cloths 1 Application(s) Topical <User Schedule>  folic acid 1 milliGRAM(s) Oral daily  HYDROmorphone  Injectable 1 milliGRAM(s) IV Push every 6 hours PRN  multivitamin 1 Tablet(s) Oral daily  mupirocin 2% Ointment 1 Application(s) Both Nostrils two times a day  thiamine IVPB 500 milliGRAM(s) IV Intermittent daily  traMADol 25 milliGRAM(s) Oral every 6 hours PRN    acetaminophen     Tablet .. 650 milliGRAM(s) Oral every 6 hours PRN  chlorhexidine 2% Cloths 1 Application(s) Topical <User Schedule>  folic acid 1 milliGRAM(s) Oral daily  HYDROmorphone  Injectable 1 milliGRAM(s) IV Push every 6 hours PRN  multivitamin 1 Tablet(s) Oral daily  mupirocin 2% Ointment 1 Application(s) Both Nostrils two times a day  thiamine IVPB 500 milliGRAM(s) IV Intermittent daily  traMADol 25 milliGRAM(s) Oral every 6 hours PRN    Drug Dosing Weight  Height (cm): 182.8 (09 May 2023 12:00)  Weight (kg): 74.9 (07 May 2023 16:18)  BMI (kg/m2): 22.4 (09 May 2023 12:00)  BSA (m2): 1.96 (09 May 2023 12:00)    CENTRAL LINE: [ ] YES [ ] NO  LOCATION:     PRATER: [ ] YES [ ] NO      A-LINE:  [ ] YES [ ] NO  LOCATION:         ICU Vital Signs Last 24 Hrs  T(C): 37.8 (09 May 2023 12:00), Max: 38 (08 May 2023 21:31)  T(F): 100 (09 May 2023 12:00), Max: 100.4 (08 May 2023 21:31)  HR: 128 (09 May 2023 12:00) (98 - 162)  BP: 124/79 (09 May 2023 12:00) (98/83 - 145/85)  BP(mean): 90 (09 May 2023 12:00) (79 - 112)  ABP: --  ABP(mean): --  RR: 21 (09 May 2023 12:00) (17 - 34)  SpO2: 97% (09 May 2023 12:00) (81% - 100%)        ABG - ( 07 May 2023 18:04 )  pH, Arterial: 7.32  pH, Blood: x     /  pCO2: 24    /  pO2: 102   / HCO3: 12    / Base Excess: -11.8 /  SaO2: 100                    @ 07:01  -   @ 07:00  --------------------------------------------------------  IN: 2830 mL / OUT: 350 mL / NET: 2480 mL            PHYSICAL EXAM:    GENERAL: NAD, well-groomed, well-developed  EYES: EOMI, PERRLA,   NECK: Supple, No JVD; Normal thyroid; Trachea midline  NERVOUS SYSTEM:  Alert & Oriented X3,  Motor Strength 5/5 B/L upper and lower extremities; DTRs 2+ intact and symmetric  CHEST/LUNG: No rales, rhonchi, wheezing   HEART: Regular rate and rhythm; No murmurs,   ABDOMEN: Soft, Nontender, Nondistended; Bowel sounds present  EXTREMITIES:  2+ Peripheral Pulses, No clubbing, cyanosis, or edema      LABS:                        10.4   8.51  )-----------( 55       ( 09 May 2023 11:15 )             29.7     05-09    136  |  100  |  3<L>  ----------------------------<  111<H>  3.2<L>   |  30  |  0.45<L>    Ca    8.0<L>      09 May 2023 11:15  Phos  1.9     05-  Mg     3.5     05    TPro  5.4<L>  /  Alb  2.7<L>  /  TBili  1.4<H>  /  DBili  x   /  AST  181<H>  /  ALT  57  /  AlkPhos  79  -    PT/INR - ( 09 May 2023 05:15 )   PT: 15.7 sec;   INR: 1.32 ratio         PTT - ( 09 May 2023 05:15 )  PTT:42.3 sec  Urinalysis Basic - ( 07 May 2023 15:50 )    Color: Yellow / Appearance: Slightly Turbid / S.020 / pH: x  Gluc: x / Ketone: Large  / Bili: Negative / Urobili: Negative   Blood: x / Protein: 30 mg/dL / Nitrite: Negative   Leuk Esterase: Negative / RBC: 2-5 /HPF / WBC 0-2 /HPF   Sq Epi: x / Non Sq Epi: x / Bacteria: Few /HPF      CAPILLARY BLOOD GLUCOSE      POCT Blood Glucose.: 97 mg/dL (09 May 2023 11:12)  POCT Blood Glucose.: 84 mg/dL (09 May 2023 07:00)  POCT Blood Glucose.: 98 mg/dL (09 May 2023 01:14)  POCT Blood Glucose.: 109 mg/dL (08 May 2023 18:15)    Culture Results:   No growth to date. ( @ 16:00)  Culture Results:   No growth to date. ( @ 15:50)  Culture Results:   <10,000 CFU/mL Normal Urogenital Vera ( @ 15:50)      RADIOLOGY & ADDITIONAL STUDIES REVIEWED:  ***

## 2023-05-09 NOTE — CHART NOTE - NSCHARTNOTEFT_GEN_A_CORE
Talked to patient's cousin Mr. Kilo Sousa ([phone]) at bedside. He said he was the only family member who lives nearby.  He said that patient lives at home with his mother but she is not well enough to take care of him.    Mr Kilo Sousa said that his father will call the patient's mother to discuss about patient's rehab.  They likely will want patient to go to inpatient alcohol rehab.       Primary team to keep in touch with Mr. Kilo Sousa to get information about on family's decision L index finger sm avulsion - tetanus/IUTD - local wound care & dressings placed, return precautions discussed, op PCP f/u as needed

## 2023-05-09 NOTE — DIETITIAN INITIAL EVALUATION ADULT - MALNUTRITION
PCM (moderate) related to ETOH/ pancreatitis as evidenced by <50% needs met> 5 days, weight loss (?details)

## 2023-05-09 NOTE — CONSULT NOTE ADULT - ASSESSMENT
Assessment:   43M with PMHx of EtOH abuse p/w abdominal pain, nausea and vomiting. Admitted for alcoholic pancreatitis and ketoacidosis.      PLAN:  -----------------CNS:------------------  #Alcohol abuse/ Withdrawal/ DTs  - transitioned to phenobarb load 260mg IV on 5/9 re-admit to ICU  - [facility] protocol  - cont thiamine, folate, multivitamin, PPI  - s/p Ativan for sx-triggered therapy   - SBIRT / SW consult   - family interested in rehab    -----------------CVS:------------------  #sinus tachycardia    -----------------RESPIRATORY:--------    -----------------GI:----------------------  # Alcoholic pancreatitis.   pt p/w n/v abdominal pain   VS as above   Lipase 14k  f/u CT a/p  c/w IVF and pain control  started on clear liquid diet.  -----------------ID:-----------------------    -----------------RENAL: ---------------  #Alcoholic ketoacidosis  pt p/w n/v abdominal pain   VS as above,   Bicarb 9, ABG <7  s/p 2 amps bicarb and 2L in ED    s/p on bicarb drip  CIWA protocol  currently no AG gap  withdrawalhas symptoms of withdrawal will be started on phenobarbital.    #SRINIVAS (resolved)  SRINIVAS (acute kidney injury).   Cr 2.85 >> now 0.45  pt without hx of CKD   in setting of hypovolemia/ severe acidosis  s/p IVF as above  Avoid nephrotoxins    -----------------HEME/ONC: ---------------  #thrombocytopenia     -----------------EXTREMITIES:-------    -----------------PROPHYLAXIS: -------    DVT   GI    -----------------DISPOSITION--------   Assessment:   43M with PMHx of EtOH abuse p/w abdominal pain, nausea and vomiting. Admitted for alcoholic pancreatitis and ketoacidosis.      PLAN:  -----------------CNS:------------------  #Alcohol abuse/ Withdrawal/ DTs  - transitioned to phenobarb load 260mg IV on 5/9 re-admit to ICU  - [facility] protocol  - cont thiamine, folate, multivitamin, PPI  - s/p Ativan for sx-triggered therapy   - SBIRT / SW consult   - family interested in rehab    -----------------CVS:------------------  #sinus tachycardia  persistent sinus tach in setting ETOH w/d and dehydration and pain from pancreatitis  cardizem PRN for HR over 150    -----------------RESPIRATORY:--------  No issues    -----------------GI:----------------------  # Alcoholic pancreatitis.   pt p/w n/v abdominal pain   Lipase 14k  f/u CT a/p showed acute pancreatitis and hepatic steatosis  c/w IVF D5 NS and pain control  advanced from clear liquid diet > reg - anticipate poor PO intake during phenobarb load, will resume IVF with dextrose    -----------------ID:-----------------------  No issues    -----------------RENAL: ---------------  #Alcoholic ketoacidosis  pt p/w n/v abdominal pain   VS as above,   Bicarb 9, ABG <7  s/p 2 amps bicarb and 2L in ED    s/p on bicarb drip  CIWA protocol  currently no AG gap  withdrawalhas symptoms of withdrawal will be started on phenobarbital.    #SRINIVAS (resolved)  SRINIVAS (acute kidney injury).   Cr 2.85 >> now 0.45  pt without hx of CKD   in setting of hypovolemia/ severe acidosis  s/p IVF as above  Avoid nephrotoxins    -----------------HEME/ONC: ---------------  #thrombocytopenia   plt 55  holding chemical DVT ppx    -----------------EXTREMITIES:-------  peripheral IV  -----------------PROPHYLAXIS: -------    DVT  on HOLD  GI PPI IV QD    -----------------DISPOSITION--------  ICU

## 2023-05-09 NOTE — CHART NOTE - NSCHARTNOTEFT_GEN_A_CORE
EVENT: Code grey activated for agitation, pacing unit and [address], not responding to verbal intervention    BRIEF HPI:  42 yo with no prior medical history, hx of Alcohol abuse and tobacco dependence, p/w abdominal pain and vomiting, patient admitted for Alcoholic ketoacidosis and pancreatitis. Endorses epigastric pain, no radiation, last drink was yesterday, drank about 1/2 bottle of vodka yesterday. Cousin at bedside says patient last visited a doctor 3 years ago, lives with his elderly mother (who is unwell, cannot walk or care for herself). No hx of any other drug use. ICU consulted for close hemodynamic monitoring givens severe lactic acidosis with HAGMA. Admitted for pancreatitis with severe lactic acidosis HAGMA + alcoholic ketoacidosis. In the icu pts lactate improved. Pts gap closed was no long at risk for ketoacidosis. Pt was switched from ativan to phenobarbital if CIWA >8 and he starts to be in alcohol withdrawal. Pts diet was upgraded to clear liquids and pt tolerated well. pt had episodes of hypophosphatemia, was replaced, with serial bmp. Pt had remains slightly tachycardic, likely from his alcohol withdrawals. Pts plt low today, dvt proph was paused. Downgraded to medicine today.    OBJECTIVE:  Vital Signs Last 24 Hrs  T(C): 36.9 (09 May 2023 20:36), Max: 38 (08 May 2023 21:31)  T(F): 98.5 (09 May 2023 20:36), Max: 100.4 (08 May 2023 21:31)  HR: 125 (09 May 2023 20:36) (101 - 162)  BP: 145/87 (09 May 2023 20:36) (98/83 - 145/87)  BP(mean): 108 (09 May 2023 16:00) (79 - 112)  RR: 19 (09 May 2023 20:36) (17 - 34)  SpO2: 100% (09 May 2023 20:36) (81% - 100%)    Parameters below as of 09 May 2023 20:36  Patient On (Oxygen Delivery Method): room air    FOCUSED PHYSICAL EXAM:  NEURO: Agitated, walking hallway asking to "go home"  RESP: Inc with agitation  CV: JOSELINE    LABS:                        10.4   8.51  )-----------( 55       ( 09 May 2023 11:15 )             29.7     05-09    136  |  100  |  3<L>  ----------------------------<  111<H>  3.2<L>   |  30  |  0.45<L>    Ca    8.0<L>      09 May 2023 11:15  Phos  1.9     05-09  Mg     3.5     05-09    TPro  5.4<L>  /  Alb  2.7<L>  /  TBili  1.4<H>  /  DBili  x   /  AST  181<H>  /  ALT  57  /  AlkPhos  79  05-09    PROBLEM: Agitation probably due to ETOH withdrawal  PLAN:   1. Lorazepam   Injectable 2 rupesh GRAM(s) IV Push once now  2. Cont Lorazepam   Injectable 2 rupesh GRAM(s) IV Push every 4 hours PRN Agitation  3. Cont CIWA-A management     FOLLOW UP / RESULT: CIWA score

## 2023-05-09 NOTE — DIETITIAN INITIAL EVALUATION ADULT - ADD RECOMMEND
Diet advanced. May consider adding PO supplement (? Ensure clears BID vs ?Enlive or Ensure Compact: dependent on pt's intake and goals for diet (re: pancreatitis). MD to monitor. RD available. v PCM (moderate). Diet advanced. May consider adding PO supplement (? Ensure clears BID vs ?Enlive or Ensure Compact: dependent on pt's intake and goals for diet (re: pancreatitis). MD to monitor. RD available. v

## 2023-05-09 NOTE — DIETITIAN INITIAL EVALUATION ADULT - PERTINENT MEDS FT
MEDICATIONS  (STANDING):  chlorhexidine 2% Cloths 1 Application(s) Topical <User Schedule>  folic acid 1 milliGRAM(s) Oral daily  multivitamin 1 Tablet(s) Oral daily  mupirocin 2% Ointment 1 Application(s) Both Nostrils two times a day  thiamine IVPB 500 milliGRAM(s) IV Intermittent daily    MEDICATIONS  (PRN):  acetaminophen     Tablet .. 650 milliGRAM(s) Oral every 6 hours PRN Temp greater or equal to 38C (100.4F), Mild Pain (1 - 3)  HYDROmorphone  Injectable 1 milliGRAM(s) IV Push every 6 hours PRN Severe Pain (7 - 10)  traMADol 25 milliGRAM(s) Oral every 6 hours PRN Moderate Pain (4 - 6)

## 2023-05-09 NOTE — DIETITIAN INITIAL EVALUATION ADULT - OTHER INFO
Noted w/ ETOH pancreatitis. Pt visited, MDs appeared to round on Pt. Pt reports some weight loss a few months ago (?details). Does not endorse poor PO PTA. Pt reports he does not eat beef (relayed this to kitchen). Pt may be for D/G

## 2023-05-09 NOTE — CHART NOTE - NSCHARTNOTEFT_GEN_A_CORE
Patient is a 42 yo with no prior medical history, hx of Alcohol abuse and tobacco dependence, p/w abdominal pain and vomiting, patient admitted for Alcoholic ketoacidosis and pancreatitis. Endorses epigastric pain, no radiation, last drink was yesterday, drank about 1/2 bottle of vodka yesterday. Cousin at bedside says patient last visited a doctor 3 years ago, lives with his elderly mother (who is unwell, cannot walk or care for herself). No hx of any other drug use. ICU consulted for close hemodynamic monitoring givens severe lactic acidosis with HAGMA. Admitted for pancreatitis with severe lactic acidosis HAGMA + alcoholic ketoacidosis. In the icu pts lactate improved. Pts gap closed was no long at risk for ketoacidosis. Pt was switched from ativan to phenobarbital if CIWA >8 and he starts to be in alcohol withdrawal. Pts diet was upgraded to clear liquids and pt tolerated well. pt had episodes of hypophosphatemia, was repleated, with serial bmps. Pt had remains slightly tachycardic, likely from his alcohol withdrawls. Pts plt low today, dvt proph was paused. Pt is stable to be downgraded to medicine.     Primary team to follow:   follow up BMP at 6pm   f/u PLT   Follow up cultures   f/u Phos levels   f/u Methyl alcohol, and Ethylene glycol   once DC have a cardio follow up OP     Signed out to attending Dr. Barillas and DASHA Conley

## 2023-05-09 NOTE — PROGRESS NOTE ADULT - SUBJECTIVE AND OBJECTIVE BOX
Patient is a 43y old  Male who presents with a chief complaint of Pancreatitis, Alcoholic Ketosis (08 May 2023 12:33)    pt seen in icu [ x ], reg med floor [   ], bed [ x ], chair at bedside [   ], awake and responsive [  ],   somnolent but arousable  [ x ],  nad [ x ]        Allergies    No Known Allergies        Vitals    T(F): 99.3 (05-09-23 @ 05:00), Max: 100.4 (05-08-23 @ 21:31)  HR: 116 (05-09-23 @ 05:00) (98 - 138)  BP: 110/70 (05-09-23 @ 05:00) (98/83 - 139/99)  RR: 34 (05-09-23 @ 05:00) (17 - 34)  SpO2: 96% (05-09-23 @ 05:00) (81% - 100%)  Wt(kg): --  CAPILLARY BLOOD GLUCOSE      POCT Blood Glucose.: 98 mg/dL (09 May 2023 01:14)      Labs                          10.0   8.16  )-----------( 44       ( 09 May 2023 05:15 )             28.3       05-09    137  |  103  |  3<L>  ----------------------------<  119<H>  9.0<HH>   |  28  |  0.35<L>    Ca    7.4<L>      09 May 2023 05:15  Phos  13.9     05-09  Mg     2.6     05-09    TPro  4.8<L>  /  Alb  2.4<L>  /  TBili  1.1  /  DBili  x   /  AST  78<H>  /  ALT  35  /  AlkPhos  67  05-09      CARDIAC MARKERS ( 07 May 2023 14:10 )  x     / x     / 120 U/L / x     / 3.8 ng/mL      Troponin I, High Sensitivity Result: 8.6 ng/L (05-07-23 @ 14:10)    .Blood Blood-Peripheral  05-07 @ 16:00   No growth to date.  --  --      .Blood Blood-Peripheral  05-07 @ 15:50   No growth to date.  --  --          Radiology Results      Meds    MEDICATIONS  (STANDING):  chlorhexidine 2% Cloths 1 Application(s) Topical <User Schedule>  dextrose 5% + sodium chloride 0.9%. 1000 milliLiter(s) (90 mL/Hr) IV Continuous <Continuous>  folic acid 1 milliGRAM(s) Oral daily  heparin   Injectable 5000 Unit(s) SubCutaneous every 8 hours  multivitamin 1 Tablet(s) Oral daily  mupirocin 2% Ointment 1 Application(s) Both Nostrils two times a day  pantoprazole  Injectable 40 milliGRAM(s) IV Push daily  thiamine IVPB 500 milliGRAM(s) IV Intermittent daily      MEDICATIONS  (PRN):  acetaminophen     Tablet .. 650 milliGRAM(s) Oral every 6 hours PRN Temp greater or equal to 38C (100.4F), Mild Pain (1 - 3)  HYDROmorphone  Injectable 1 milliGRAM(s) IV Push every 6 hours PRN Severe Pain (7 - 10)  traMADol 25 milliGRAM(s) Oral every 6 hours PRN Moderate Pain (4 - 6)      Physical Exam    Neuro :  no focal deficits  Respiratory: CTA B/L  CV: RRR, S1S2, no murmurs,   Abdominal: Soft, NT, ND +BS,  Extremities: No edema, + peripheral pulses    ASSESSMENT    alcoholic keto acidosis,   alcoholic pancreatitis,   alcohol abuse,   edvin,   h/o EtOH abuse   tobacco dependence          PLAN    ,   pt now off bicarb drip,   ativan as per ciwa protocol,   folic acid, thiamine, multivitamin,   f/u ct abd pelv   serum creat wnl   cont protonix    cont current meds   mgmt as per icu              Patient is a 43y old  Male who presents with a chief complaint of Pancreatitis, Alcoholic Ketosis (08 May 2023 12:33)    pt seen in icu [ x ], reg med floor [   ], bed [ x ], chair at bedside [   ], a + O x3 [x  ],   somnolent but arousable  [ ],  nad [ x ]        Allergies    No Known Allergies        Vitals    T(F): 99.3 (05-09-23 @ 05:00), Max: 100.4 (05-08-23 @ 21:31)  HR: 116 (05-09-23 @ 05:00) (98 - 138)  BP: 110/70 (05-09-23 @ 05:00) (98/83 - 139/99)  RR: 34 (05-09-23 @ 05:00) (17 - 34)  SpO2: 96% (05-09-23 @ 05:00) (81% - 100%)  Wt(kg): --  CAPILLARY BLOOD GLUCOSE      POCT Blood Glucose.: 98 mg/dL (09 May 2023 01:14)      Labs                          10.0   8.16  )-----------( 44       ( 09 May 2023 05:15 )             28.3       05-09    137  |  103  |  3<L>  ----------------------------<  119<H>  9.0<HH>   |  28  |  0.35<L>    Ca    7.4<L>      09 May 2023 05:15  Phos  13.9     05-09  Mg     2.6     05-09    TPro  4.8<L>  /  Alb  2.4<L>  /  TBili  1.1  /  DBili  x   /  AST  78<H>  /  ALT  35  /  AlkPhos  67  05-09      CARDIAC MARKERS ( 07 May 2023 14:10 )  x     / x     / 120 U/L / x     / 3.8 ng/mL      Troponin I, High Sensitivity Result: 8.6 ng/L (05-07-23 @ 14:10)    .Blood Blood-Peripheral  05-07 @ 16:00   No growth to date.  --  --      .Blood Blood-Peripheral  05-07 @ 15:50   No growth to date.  --  --          Radiology Results    < from: CT Abdomen and Pelvis No Cont (05.07.23 @ 14:32) >    IMPRESSION: Acute pancreatitis.    Hepatic steatosis.    < end of copied text >        Meds    MEDICATIONS  (STANDING):  chlorhexidine 2% Cloths 1 Application(s) Topical <User Schedule>  dextrose 5% + sodium chloride 0.9%. 1000 milliLiter(s) (90 mL/Hr) IV Continuous <Continuous>  folic acid 1 milliGRAM(s) Oral daily  heparin   Injectable 5000 Unit(s) SubCutaneous every 8 hours  multivitamin 1 Tablet(s) Oral daily  mupirocin 2% Ointment 1 Application(s) Both Nostrils two times a day  pantoprazole  Injectable 40 milliGRAM(s) IV Push daily  thiamine IVPB 500 milliGRAM(s) IV Intermittent daily      MEDICATIONS  (PRN):  acetaminophen     Tablet .. 650 milliGRAM(s) Oral every 6 hours PRN Temp greater or equal to 38C (100.4F), Mild Pain (1 - 3)  HYDROmorphone  Injectable 1 milliGRAM(s) IV Push every 6 hours PRN Severe Pain (7 - 10)  traMADol 25 milliGRAM(s) Oral every 6 hours PRN Moderate Pain (4 - 6)      Physical Exam    Neuro :  no focal deficits  Respiratory: CTA B/L  CV: RRR, S1S2, no murmurs,   Abdominal: Soft, NT, ND +BS,  Extremities: No edema, + peripheral pulses    ASSESSMENT    alcoholic keto acidosis,   alcoholic pancreatitis,   alcohol abuse,   edvin,   h/o EtOH abuse   tobacco dependence          PLAN    ,   pt now off bicarb drip,   ativan as per ciwa protocol,   folic acid, thiamine, multivitamin,   ct abd pelv with Acute pancreatitis. Hepatic steatosis noted above.   serum creat wnl   phos, potassium elevated noted   rept bmp  cont protonix    cont current meds   mgmt as per icu

## 2023-05-10 DIAGNOSIS — F10.939 ALCOHOL USE, UNSPECIFIED WITH WITHDRAWAL, UNSPECIFIED: ICD-10-CM

## 2023-05-10 LAB
ALBUMIN SERPL ELPH-MCNC: 2.3 G/DL — LOW (ref 3.5–5)
ALBUMIN SERPL ELPH-MCNC: 2.6 G/DL — LOW (ref 3.5–5)
ALP SERPL-CCNC: 70 U/L — SIGNIFICANT CHANGE UP (ref 40–120)
ALP SERPL-CCNC: 78 U/L — SIGNIFICANT CHANGE UP (ref 40–120)
ALT FLD-CCNC: 50 U/L DA — SIGNIFICANT CHANGE UP (ref 10–60)
ALT FLD-CCNC: 58 U/L DA — SIGNIFICANT CHANGE UP (ref 10–60)
ANION GAP SERPL CALC-SCNC: 2 MMOL/L — LOW (ref 5–17)
ANION GAP SERPL CALC-SCNC: 5 MMOL/L — SIGNIFICANT CHANGE UP (ref 5–17)
ANION GAP SERPL CALC-SCNC: 6 MMOL/L — SIGNIFICANT CHANGE UP (ref 5–17)
AST SERPL-CCNC: 101 U/L — HIGH (ref 10–40)
AST SERPL-CCNC: 82 U/L — HIGH (ref 10–40)
BASOPHILS # BLD AUTO: 0.03 K/UL — SIGNIFICANT CHANGE UP (ref 0–0.2)
BASOPHILS NFR BLD AUTO: 0.4 % — SIGNIFICANT CHANGE UP (ref 0–2)
BILIRUB SERPL-MCNC: 0.9 MG/DL — SIGNIFICANT CHANGE UP (ref 0.2–1.2)
BILIRUB SERPL-MCNC: 1 MG/DL — SIGNIFICANT CHANGE UP (ref 0.2–1.2)
BUN SERPL-MCNC: 4 MG/DL — LOW (ref 7–18)
BUN SERPL-MCNC: 4 MG/DL — LOW (ref 7–18)
BUN SERPL-MCNC: 5 MG/DL — LOW (ref 7–18)
CALCIUM SERPL-MCNC: 7.3 MG/DL — LOW (ref 8.4–10.5)
CALCIUM SERPL-MCNC: 8.5 MG/DL — SIGNIFICANT CHANGE UP (ref 8.4–10.5)
CALCIUM SERPL-MCNC: 8.5 MG/DL — SIGNIFICANT CHANGE UP (ref 8.4–10.5)
CHLORIDE SERPL-SCNC: 101 MMOL/L — SIGNIFICANT CHANGE UP (ref 96–108)
CHLORIDE SERPL-SCNC: 105 MMOL/L — SIGNIFICANT CHANGE UP (ref 96–108)
CHLORIDE SERPL-SCNC: 111 MMOL/L — HIGH (ref 96–108)
CO2 SERPL-SCNC: 24 MMOL/L — SIGNIFICANT CHANGE UP (ref 22–31)
CO2 SERPL-SCNC: 32 MMOL/L — HIGH (ref 22–31)
CO2 SERPL-SCNC: 33 MMOL/L — HIGH (ref 22–31)
CREAT SERPL-MCNC: 0.31 MG/DL — LOW (ref 0.5–1.3)
CREAT SERPL-MCNC: 0.31 MG/DL — LOW (ref 0.5–1.3)
CREAT SERPL-MCNC: 0.57 MG/DL — SIGNIFICANT CHANGE UP (ref 0.5–1.3)
EGFR: 126 ML/MIN/1.73M2 — SIGNIFICANT CHANGE UP
EGFR: 151 ML/MIN/1.73M2 — SIGNIFICANT CHANGE UP
EGFR: 151 ML/MIN/1.73M2 — SIGNIFICANT CHANGE UP
EOSINOPHIL # BLD AUTO: 0.23 K/UL — SIGNIFICANT CHANGE UP (ref 0–0.5)
EOSINOPHIL NFR BLD AUTO: 3 % — SIGNIFICANT CHANGE UP (ref 0–6)
GLUCOSE SERPL-MCNC: 105 MG/DL — HIGH (ref 70–99)
GLUCOSE SERPL-MCNC: 109 MG/DL — HIGH (ref 70–99)
GLUCOSE SERPL-MCNC: 97 MG/DL — SIGNIFICANT CHANGE UP (ref 70–99)
HCT VFR BLD CALC: 27.4 % — LOW (ref 39–50)
HGB BLD-MCNC: 9.5 G/DL — LOW (ref 13–17)
IMM GRANULOCYTES NFR BLD AUTO: 0.4 % — SIGNIFICANT CHANGE UP (ref 0–0.9)
LYMPHOCYTES # BLD AUTO: 0.72 K/UL — LOW (ref 1–3.3)
LYMPHOCYTES # BLD AUTO: 9.4 % — LOW (ref 13–44)
MAGNESIUM SERPL-MCNC: 1.9 MG/DL — SIGNIFICANT CHANGE UP (ref 1.6–2.6)
MAGNESIUM SERPL-MCNC: 2.2 MG/DL — SIGNIFICANT CHANGE UP (ref 1.6–2.6)
MAGNESIUM SERPL-MCNC: 2.2 MG/DL — SIGNIFICANT CHANGE UP (ref 1.6–2.6)
MCHC RBC-ENTMCNC: 28.7 PG — SIGNIFICANT CHANGE UP (ref 27–34)
MCHC RBC-ENTMCNC: 34.7 GM/DL — SIGNIFICANT CHANGE UP (ref 32–36)
MCV RBC AUTO: 82.8 FL — SIGNIFICANT CHANGE UP (ref 80–100)
MONOCYTES # BLD AUTO: 0.52 K/UL — SIGNIFICANT CHANGE UP (ref 0–0.9)
MONOCYTES NFR BLD AUTO: 6.8 % — SIGNIFICANT CHANGE UP (ref 2–14)
NEUTROPHILS # BLD AUTO: 6.1 K/UL — SIGNIFICANT CHANGE UP (ref 1.8–7.4)
NEUTROPHILS NFR BLD AUTO: 80 % — HIGH (ref 43–77)
NRBC # BLD: 0 /100 WBCS — SIGNIFICANT CHANGE UP (ref 0–0)
PHOSPHATE SERPL-MCNC: 2.1 MG/DL — LOW (ref 2.5–4.5)
PHOSPHATE SERPL-MCNC: 2.7 MG/DL — SIGNIFICANT CHANGE UP (ref 2.5–4.5)
PHOSPHATE SERPL-MCNC: 4.4 MG/DL — SIGNIFICANT CHANGE UP (ref 2.5–4.5)
PLATELET # BLD AUTO: 55 K/UL — LOW (ref 150–400)
POTASSIUM SERPL-MCNC: 3 MMOL/L — LOW (ref 3.5–5.3)
POTASSIUM SERPL-MCNC: 3.3 MMOL/L — LOW (ref 3.5–5.3)
POTASSIUM SERPL-MCNC: 3.5 MMOL/L — SIGNIFICANT CHANGE UP (ref 3.5–5.3)
POTASSIUM SERPL-SCNC: 3 MMOL/L — LOW (ref 3.5–5.3)
POTASSIUM SERPL-SCNC: 3.3 MMOL/L — LOW (ref 3.5–5.3)
POTASSIUM SERPL-SCNC: 3.5 MMOL/L — SIGNIFICANT CHANGE UP (ref 3.5–5.3)
PROT SERPL-MCNC: 5 G/DL — LOW (ref 6–8.3)
PROT SERPL-MCNC: 5.5 G/DL — LOW (ref 6–8.3)
RBC # BLD: 3.31 M/UL — LOW (ref 4.2–5.8)
RBC # FLD: 16.6 % — HIGH (ref 10.3–14.5)
SODIUM SERPL-SCNC: 138 MMOL/L — SIGNIFICANT CHANGE UP (ref 135–145)
SODIUM SERPL-SCNC: 140 MMOL/L — SIGNIFICANT CHANGE UP (ref 135–145)
SODIUM SERPL-SCNC: 141 MMOL/L — SIGNIFICANT CHANGE UP (ref 135–145)
WBC # BLD: 7.63 K/UL — SIGNIFICANT CHANGE UP (ref 3.8–10.5)
WBC # FLD AUTO: 7.63 K/UL — SIGNIFICANT CHANGE UP (ref 3.8–10.5)

## 2023-05-10 PROCEDURE — 99233 SBSQ HOSP IP/OBS HIGH 50: CPT | Mod: GC

## 2023-05-10 RX ORDER — POTASSIUM PHOSPHATE, MONOBASIC POTASSIUM PHOSPHATE, DIBASIC 236; 224 MG/ML; MG/ML
15 INJECTION, SOLUTION INTRAVENOUS ONCE
Refills: 0 | Status: COMPLETED | OUTPATIENT
Start: 2023-05-10 | End: 2023-05-10

## 2023-05-10 RX ORDER — PHENOBARBITAL 60 MG
130 TABLET ORAL ONCE
Refills: 0 | Status: DISCONTINUED | OUTPATIENT
Start: 2023-05-10 | End: 2023-05-10

## 2023-05-10 RX ORDER — ENOXAPARIN SODIUM 100 MG/ML
40 INJECTION SUBCUTANEOUS EVERY 24 HOURS
Refills: 0 | Status: DISCONTINUED | OUTPATIENT
Start: 2023-05-10 | End: 2023-05-12

## 2023-05-10 RX ORDER — SODIUM CHLORIDE 9 MG/ML
1000 INJECTION, SOLUTION INTRAVENOUS
Refills: 0 | Status: DISCONTINUED | OUTPATIENT
Start: 2023-05-10 | End: 2023-05-10

## 2023-05-10 RX ORDER — POTASSIUM CHLORIDE 20 MEQ
40 PACKET (EA) ORAL ONCE
Refills: 0 | Status: COMPLETED | OUTPATIENT
Start: 2023-05-10 | End: 2023-05-10

## 2023-05-10 RX ORDER — HEPARIN SODIUM 5000 [USP'U]/ML
5000 INJECTION INTRAVENOUS; SUBCUTANEOUS EVERY 8 HOURS
Refills: 0 | Status: DISCONTINUED | OUTPATIENT
Start: 2023-05-10 | End: 2023-05-10

## 2023-05-10 RX ORDER — POTASSIUM CHLORIDE 20 MEQ
10 PACKET (EA) ORAL
Refills: 0 | Status: COMPLETED | OUTPATIENT
Start: 2023-05-10 | End: 2023-05-10

## 2023-05-10 RX ORDER — DEXMEDETOMIDINE HYDROCHLORIDE IN 0.9% SODIUM CHLORIDE 4 UG/ML
0.2 INJECTION INTRAVENOUS
Qty: 400 | Refills: 0 | Status: DISCONTINUED | OUTPATIENT
Start: 2023-05-10 | End: 2023-05-10

## 2023-05-10 RX ADMIN — POTASSIUM PHOSPHATE, MONOBASIC POTASSIUM PHOSPHATE, DIBASIC 62.5 MILLIMOLE(S): 236; 224 INJECTION, SOLUTION INTRAVENOUS at 02:40

## 2023-05-10 RX ADMIN — MUPIROCIN 1 APPLICATION(S): 20 OINTMENT TOPICAL at 05:51

## 2023-05-10 RX ADMIN — MUPIROCIN 1 APPLICATION(S): 20 OINTMENT TOPICAL at 21:56

## 2023-05-10 RX ADMIN — DEXMEDETOMIDINE HYDROCHLORIDE IN 0.9% SODIUM CHLORIDE 3.77 MICROGRAM(S)/KG/HR: 4 INJECTION INTRAVENOUS at 01:20

## 2023-05-10 RX ADMIN — SODIUM CHLORIDE 100 MILLILITER(S): 9 INJECTION, SOLUTION INTRAVENOUS at 01:20

## 2023-05-10 RX ADMIN — Medication 100 MILLIEQUIVALENT(S): at 02:57

## 2023-05-10 RX ADMIN — Medication 10 MILLIGRAM(S): at 00:38

## 2023-05-10 RX ADMIN — Medication 130 MILLIGRAM(S): at 00:22

## 2023-05-10 RX ADMIN — Medication 105 MILLIGRAM(S): at 13:32

## 2023-05-10 RX ADMIN — Medication 100 MILLIEQUIVALENT(S): at 01:56

## 2023-05-10 RX ADMIN — Medication 130 MILLIGRAM(S): at 00:01

## 2023-05-10 RX ADMIN — Medication 100 MILLIEQUIVALENT(S): at 04:34

## 2023-05-10 RX ADMIN — ENOXAPARIN SODIUM 40 MILLIGRAM(S): 100 INJECTION SUBCUTANEOUS at 13:43

## 2023-05-10 RX ADMIN — DEXMEDETOMIDINE HYDROCHLORIDE IN 0.9% SODIUM CHLORIDE 3.77 MICROGRAM(S)/KG/HR: 4 INJECTION INTRAVENOUS at 10:18

## 2023-05-10 RX ADMIN — Medication 130 MILLIGRAM(S): at 01:03

## 2023-05-10 RX ADMIN — Medication 40 MILLIEQUIVALENT(S): at 21:56

## 2023-05-10 NOTE — PROGRESS NOTE ADULT - SUBJECTIVE AND OBJECTIVE BOX
Patient is a 43y old  Male who presents with a chief complaint of Pancreatitis, Alcoholic Ketosis (10 May 2023 12:03)    INTERVAL HISTORY/ OVERNIGHT EVENTS:     PRESSORS: [ ] YES [ ] NO  WHICH:    ANTIBIOTICS:                  DATE STARTED:  ANTIBIOTICS:                  DATE STARTED:  ANTIBIOTICS:                  DATE STARTED:    Antimicrobial:    Cardiovascular:    Pulmonary:    Hematalogic:  enoxaparin Injectable 40 milliGRAM(s) SubCutaneous every 24 hours    Other:  acetaminophen     Tablet .. 650 milliGRAM(s) Oral every 6 hours PRN  folic acid 1 milliGRAM(s) Oral daily  multivitamin 1 Tablet(s) Oral daily  mupirocin 2% Ointment 1 Application(s) Both Nostrils two times a day  traMADol 25 milliGRAM(s) Oral every 6 hours PRN    acetaminophen     Tablet .. 650 milliGRAM(s) Oral every 6 hours PRN  enoxaparin Injectable 40 milliGRAM(s) SubCutaneous every 24 hours  folic acid 1 milliGRAM(s) Oral daily  multivitamin 1 Tablet(s) Oral daily  mupirocin 2% Ointment 1 Application(s) Both Nostrils two times a day  traMADol 25 milliGRAM(s) Oral every 6 hours PRN    Drug Dosing Weight  Height (cm): 182.8 (09 May 2023 12:00)  Weight (kg): 75.4 (09 May 2023 21:50)  BMI (kg/m2): 22.6 (09 May 2023 21:50)  BSA (m2): 1.97 (09 May 2023 21:50)    CENTRAL LINE: [ ] YES [ ] NO  LOCATION:     PRATER: [ ] YES [ ] NO      A-LINE:  [ ] YES [ ] NO  LOCATION:         ICU Vital Signs Last 24 Hrs  T(C): 36.7 (10 May 2023 09:00), Max: 37.8 (10 May 2023 00:00)  T(F): 98 (10 May 2023 09:00), Max: 100 (10 May 2023 00:00)  HR: 64 (10 May 2023 12:00) (64 - 132)  BP: 125/96 (10 May 2023 12:00) (113/81 - 145/87)  BP(mean): 105 (10 May 2023 12:00) (85 - 108)  ABP: --  ABP(mean): --  RR: 18 (10 May 2023 12:00) (17 - 29)  SpO2: 100% (10 May 2023 12:00) (77% - 100%)    O2 Parameters below as of 10 May 2023 07:00  Patient On (Oxygen Delivery Method): room air                  05-09 @ 07:01  -  05-10 @ 07:00  --------------------------------------------------------  IN: 2582.3 mL / OUT: 1450 mL / NET: 1132.3 mL            PHYSICAL EXAM:    GENERAL: NAD,   EYES: EOMI, PERRLA,   NECK: Supple, No JVD; Normal thyroid; Trachea midline  NERVOUS SYSTEM:  alert confused,  Motor Strength 5/5 B/L upper and lower extremities; DTRs 2+ intact and symmetric  CHEST/LUNG: No rales, rhonchi, wheezing   HEART: Regular rate and rhythm; No murmurs,   ABDOMEN: Soft, Nontender, Nondistended; Bowel sounds present  EXTREMITIES:  2+ Peripheral Pulses, No clubbing, cyanosis, or edema      LABS:                        9.5    7.63  )-----------( 55       ( 10 May 2023 07:04 )             27.4     05-10    141  |  111<H>  |  4<L>  ----------------------------<  109<H>  3.3<L>   |  24  |  0.31<L>    Ca    7.3<L>      10 May 2023 08:57  Phos  2.7     05-10  Mg     1.9     05-10    TPro  5.0<L>  /  Alb  2.3<L>  /  TBili  0.9  /  DBili  x   /  AST  82<H>  /  ALT  50  /  AlkPhos  70  05-10    PT/INR - ( 09 May 2023 05:15 )   PT: 15.7 sec;   INR: 1.32 ratio         PTT - ( 09 May 2023 05:15 )  PTT:42.3 sec    CAPILLARY BLOOD GLUCOSE      POCT Blood Glucose.: 124 mg/dL (10 May 2023 11:35)    Culture Results:   No growth to date. (05-07 @ 16:00)  Culture Results:   No growth to date. (05-07 @ 15:50)  Culture Results:   <10,000 CFU/mL Normal Urogenital Vera (05-07 @ 15:50)      RADIOLOGY & ADDITIONAL STUDIES REVIEWED:  ***

## 2023-05-10 NOTE — PROGRESS NOTE ADULT - SUBJECTIVE AND OBJECTIVE BOX
Patient is a 43y old  Male who presents with a chief complaint of Pancreatitis, Alcoholic Ketosis (09 May 2023 21:33)    pt seen in icu [ x ], reg med floor [   ], bed [ x ], chair at bedside [   ], a + O x3 [x  ],   somnolent but arousable  [ ],  nad [ x ]        Allergies    No Known Allergies        Vitals    T(F): 97 (05-10-23 @ 04:00), Max: 100 (05-09-23 @ 12:00)  HR: 69 (05-10-23 @ 06:00) (69 - 139)  BP: 117/89 (05-10-23 @ 06:00) (102/67 - 145/87)  RR: 20 (05-10-23 @ 06:00) (17 - 34)  SpO2: 99% (05-10-23 @ 06:00) (89% - 100%)  Wt(kg): --  CAPILLARY BLOOD GLUCOSE      POCT Blood Glucose.: 97 mg/dL (09 May 2023 11:12)      Labs                          10.4   8.51  )-----------( 55       ( 09 May 2023 11:15 )             29.7       05-10    138  |  101  |  5<L>  ----------------------------<  97  3.0<L>   |  32<H>  |  0.57    Ca    8.5      10 May 2023 00:49  Phos  2.1     05-10  Mg     2.2     05-10    TPro  5.4<L>  /  Alb  2.7<L>  /  TBili  1.4<H>  /  DBili  x   /  AST  181<H>  /  ALT  57  /  AlkPhos  79  05-09          Troponin I, High Sensitivity Result: 8.6 ng/L (05-07-23 @ 14:10)    .Blood Blood-Peripheral  05-07 @ 16:00   No growth to date.  --  --      .Blood Blood-Peripheral  05-07 @ 15:50   No growth to date.  --  --          Radiology Results      Meds    MEDICATIONS  (STANDING):  dexMEDEtomidine Infusion 0.2 MICROgram(s)/kG/Hr (3.77 mL/Hr) IV Continuous <Continuous>  dextrose 5% + sodium chloride 0.9%. 1000 milliLiter(s) (100 mL/Hr) IV Continuous <Continuous>  folic acid 1 milliGRAM(s) Oral daily  multivitamin 1 Tablet(s) Oral daily  mupirocin 2% Ointment 1 Application(s) Both Nostrils two times a day  pantoprazole  Injectable 40 milliGRAM(s) IV Push daily  thiamine IVPB 500 milliGRAM(s) IV Intermittent daily      MEDICATIONS  (PRN):  acetaminophen     Tablet .. 650 milliGRAM(s) Oral every 6 hours PRN Temp greater or equal to 38C (100.4F), Mild Pain (1 - 3)  traMADol 25 milliGRAM(s) Oral every 6 hours PRN Moderate Pain (4 - 6)      Physical Exam    Neuro :  no focal deficits  Respiratory: CTA B/L  CV: RRR, S1S2, no murmurs,   Abdominal: Soft, NT, ND +BS,  Extremities: No edema, + peripheral pulses    ASSESSMENT    alcoholic keto acidosis,   alcoholic pancreatitis,   alcohol abuse,   edvin,   h/o EtOH abuse   tobacco dependence          PLAN    ,   pt now off bicarb drip,   ativan as per ciwa protocol,   folic acid, thiamine, multivitamin,   ct abd pelv with Acute pancreatitis. Hepatic steatosis noted above.   serum creat wnl   phos, potassium elevated noted   rept bmp  cont protonix    cont current meds   mgmt as per icu        Patient is a 43y old  Male who presents with a chief complaint of Pancreatitis, Alcoholic Ketosis (09 May 2023 21:33)    pt seen in icu [ x ], reg med floor [   ], bed [ x ], chair at bedside [   ], a + O x3 [  ],   aggitated  [x ],  nad [ x ]    pt s/p transferred to [facility] 5/9/23 then return to icu shortly after for withdrawal symptoms of agitation, pacing unit and [address], not responding to verbal intervention    Allergies    No Known Allergies        Vitals    T(F): 97 (05-10-23 @ 04:00), Max: 100 (05-09-23 @ 12:00)  HR: 69 (05-10-23 @ 06:00) (69 - 139)  BP: 117/89 (05-10-23 @ 06:00) (102/67 - 145/87)  RR: 20 (05-10-23 @ 06:00) (17 - 34)  SpO2: 99% (05-10-23 @ 06:00) (89% - 100%)  Wt(kg): --  CAPILLARY BLOOD GLUCOSE      POCT Blood Glucose.: 97 mg/dL (09 May 2023 11:12)      Labs                          10.4   8.51  )-----------( 55       ( 09 May 2023 11:15 )             29.7       05-10    138  |  101  |  5<L>  ----------------------------<  97  3.0<L>   |  32<H>  |  0.57    Ca    8.5      10 May 2023 00:49  Phos  2.1     05-10  Mg     2.2     05-10    TPro  5.4<L>  /  Alb  2.7<L>  /  TBili  1.4<H>  /  DBili  x   /  AST  181<H>  /  ALT  57  /  AlkPhos  79  05-09          Troponin I, High Sensitivity Result: 8.6 ng/L (05-07-23 @ 14:10)    .Blood Blood-Peripheral  05-07 @ 16:00   No growth to date.  --  --      .Blood Blood-Peripheral  05-07 @ 15:50   No growth to date.  --  --          Radiology Results      Meds    MEDICATIONS  (STANDING):  dexMEDEtomidine Infusion 0.2 MICROgram(s)/kG/Hr (3.77 mL/Hr) IV Continuous <Continuous>  dextrose 5% + sodium chloride 0.9%. 1000 milliLiter(s) (100 mL/Hr) IV Continuous <Continuous>  folic acid 1 milliGRAM(s) Oral daily  multivitamin 1 Tablet(s) Oral daily  mupirocin 2% Ointment 1 Application(s) Both Nostrils two times a day  pantoprazole  Injectable 40 milliGRAM(s) IV Push daily  thiamine IVPB 500 milliGRAM(s) IV Intermittent daily      MEDICATIONS  (PRN):  acetaminophen     Tablet .. 650 milliGRAM(s) Oral every 6 hours PRN Temp greater or equal to 38C (100.4F), Mild Pain (1 - 3)  traMADol 25 milliGRAM(s) Oral every 6 hours PRN Moderate Pain (4 - 6)      Physical Exam    Neuro :  no focal deficits  Respiratory: CTA B/L  CV: RRR, S1S2, no murmurs,   Abdominal: Soft, NT, ND +BS,  Extremities: No edema, + peripheral pulses    ASSESSMENT    alcohol withdrawal  alcoholic keto acidosis,   alcoholic pancreatitis,   alcohol abuse,   edvin,   h/o EtOH abuse   tobacco dependence          PLAN    on return to icu pt started on phenobarb load 260mg IV on 5/9   pt now on precedex  ciwa protocol,   folic acid, thiamine, multivitamin,   ct abd pelv with Acute pancreatitis. Hepatic steatosis noted    serum creat wnl   supplement potassium for hypokalemia  ivf  cont protonix    cont current meds   mgmt as per icu        Mohs Method Verbiage: An incision at a 45 degree angle following the standard Mohs approach was done and the specimen was harvested as a microscopic controlled layer.

## 2023-05-11 LAB
ALBUMIN SERPL ELPH-MCNC: 2.4 G/DL — LOW (ref 3.5–5)
ALP SERPL-CCNC: 101 U/L — SIGNIFICANT CHANGE UP (ref 40–120)
ALT FLD-CCNC: 73 U/L DA — HIGH (ref 10–60)
ANION GAP SERPL CALC-SCNC: 2 MMOL/L — LOW (ref 5–17)
ANION GAP SERPL CALC-SCNC: 2 MMOL/L — LOW (ref 5–17)
AST SERPL-CCNC: 148 U/L — HIGH (ref 10–40)
BILIRUB SERPL-MCNC: 0.6 MG/DL — SIGNIFICANT CHANGE UP (ref 0.2–1.2)
BUN SERPL-MCNC: 6 MG/DL — LOW (ref 7–18)
BUN SERPL-MCNC: 8 MG/DL — SIGNIFICANT CHANGE UP (ref 7–18)
CALCIUM SERPL-MCNC: 8.1 MG/DL — LOW (ref 8.4–10.5)
CALCIUM SERPL-MCNC: 9 MG/DL — SIGNIFICANT CHANGE UP (ref 8.4–10.5)
CHLORIDE SERPL-SCNC: 102 MMOL/L — SIGNIFICANT CHANGE UP (ref 96–108)
CHLORIDE SERPL-SCNC: 105 MMOL/L — SIGNIFICANT CHANGE UP (ref 96–108)
CO2 SERPL-SCNC: 29 MMOL/L — SIGNIFICANT CHANGE UP (ref 22–31)
CO2 SERPL-SCNC: 30 MMOL/L — SIGNIFICANT CHANGE UP (ref 22–31)
CREAT SERPL-MCNC: 0.51 MG/DL — SIGNIFICANT CHANGE UP (ref 0.5–1.3)
CREAT SERPL-MCNC: 0.51 MG/DL — SIGNIFICANT CHANGE UP (ref 0.5–1.3)
EGFR: 130 ML/MIN/1.73M2 — SIGNIFICANT CHANGE UP
EGFR: 130 ML/MIN/1.73M2 — SIGNIFICANT CHANGE UP
GLUCOSE SERPL-MCNC: 108 MG/DL — HIGH (ref 70–99)
GLUCOSE SERPL-MCNC: 134 MG/DL — HIGH (ref 70–99)
HCT VFR BLD CALC: 26.8 % — LOW (ref 39–50)
HGB BLD-MCNC: 9.5 G/DL — LOW (ref 13–17)
MAGNESIUM SERPL-MCNC: 1.9 MG/DL — SIGNIFICANT CHANGE UP (ref 1.6–2.6)
MCHC RBC-ENTMCNC: 29.4 PG — SIGNIFICANT CHANGE UP (ref 27–34)
MCHC RBC-ENTMCNC: 35.4 GM/DL — SIGNIFICANT CHANGE UP (ref 32–36)
MCV RBC AUTO: 83 FL — SIGNIFICANT CHANGE UP (ref 80–100)
NRBC # BLD: 0 /100 WBCS — SIGNIFICANT CHANGE UP (ref 0–0)
PHOSPHATE SERPL-MCNC: 2.3 MG/DL — LOW (ref 2.5–4.5)
PHOSPHATE SERPL-MCNC: 2.9 MG/DL — SIGNIFICANT CHANGE UP (ref 2.5–4.5)
PLATELET # BLD AUTO: 87 K/UL — LOW (ref 150–400)
POTASSIUM SERPL-MCNC: 3.3 MMOL/L — LOW (ref 3.5–5.3)
POTASSIUM SERPL-MCNC: 3.7 MMOL/L — SIGNIFICANT CHANGE UP (ref 3.5–5.3)
POTASSIUM SERPL-SCNC: 3.3 MMOL/L — LOW (ref 3.5–5.3)
POTASSIUM SERPL-SCNC: 3.7 MMOL/L — SIGNIFICANT CHANGE UP (ref 3.5–5.3)
PROT SERPL-MCNC: 5.4 G/DL — LOW (ref 6–8.3)
RBC # BLD: 3.23 M/UL — LOW (ref 4.2–5.8)
RBC # FLD: 17.2 % — HIGH (ref 10.3–14.5)
SODIUM SERPL-SCNC: 134 MMOL/L — LOW (ref 135–145)
SODIUM SERPL-SCNC: 136 MMOL/L — SIGNIFICANT CHANGE UP (ref 135–145)
WBC # BLD: 7.3 K/UL — SIGNIFICANT CHANGE UP (ref 3.8–10.5)
WBC # FLD AUTO: 7.3 K/UL — SIGNIFICANT CHANGE UP (ref 3.8–10.5)

## 2023-05-11 PROCEDURE — 99232 SBSQ HOSP IP/OBS MODERATE 35: CPT | Mod: GC

## 2023-05-11 RX ORDER — LANOLIN ALCOHOL/MO/W.PET/CERES
5 CREAM (GRAM) TOPICAL AT BEDTIME
Refills: 0 | Status: DISCONTINUED | OUTPATIENT
Start: 2023-05-11 | End: 2023-05-12

## 2023-05-11 RX ORDER — CHLORHEXIDINE GLUCONATE 213 G/1000ML
1 SOLUTION TOPICAL
Refills: 0 | Status: DISCONTINUED | OUTPATIENT
Start: 2023-05-11 | End: 2023-05-12

## 2023-05-11 RX ORDER — FOLIC ACID 0.8 MG
1 TABLET ORAL
Qty: 30 | Refills: 0
Start: 2023-05-11 | End: 2023-06-09

## 2023-05-11 RX ORDER — POTASSIUM CHLORIDE 20 MEQ
40 PACKET (EA) ORAL ONCE
Refills: 0 | Status: COMPLETED | OUTPATIENT
Start: 2023-05-11 | End: 2023-05-11

## 2023-05-11 RX ORDER — POTASSIUM PHOSPHATE, MONOBASIC POTASSIUM PHOSPHATE, DIBASIC 236; 224 MG/ML; MG/ML
15 INJECTION, SOLUTION INTRAVENOUS ONCE
Refills: 0 | Status: COMPLETED | OUTPATIENT
Start: 2023-05-11 | End: 2023-05-11

## 2023-05-11 RX ADMIN — Medication 1 TABLET(S): at 12:11

## 2023-05-11 RX ADMIN — TRAMADOL HYDROCHLORIDE 25 MILLIGRAM(S): 50 TABLET ORAL at 01:30

## 2023-05-11 RX ADMIN — ENOXAPARIN SODIUM 40 MILLIGRAM(S): 100 INJECTION SUBCUTANEOUS at 16:02

## 2023-05-11 RX ADMIN — Medication 1 MILLIGRAM(S): at 12:11

## 2023-05-11 RX ADMIN — TRAMADOL HYDROCHLORIDE 25 MILLIGRAM(S): 50 TABLET ORAL at 01:02

## 2023-05-11 RX ADMIN — POTASSIUM PHOSPHATE, MONOBASIC POTASSIUM PHOSPHATE, DIBASIC 62.5 MILLIMOLE(S): 236; 224 INJECTION, SOLUTION INTRAVENOUS at 05:49

## 2023-05-11 RX ADMIN — Medication 5 MILLIGRAM(S): at 22:41

## 2023-05-11 RX ADMIN — MUPIROCIN 1 APPLICATION(S): 20 OINTMENT TOPICAL at 18:11

## 2023-05-11 RX ADMIN — MUPIROCIN 1 APPLICATION(S): 20 OINTMENT TOPICAL at 06:05

## 2023-05-11 RX ADMIN — Medication 50 MILLIGRAM(S): at 21:06

## 2023-05-11 RX ADMIN — Medication 40 MILLIEQUIVALENT(S): at 06:01

## 2023-05-11 NOTE — PROGRESS NOTE ADULT - PROBLEM SELECTOR PLAN 4
BOB protocol  thiamine, folate, multivitamin  Ativan for sx-triggered therapy   SBIRT   SW consult   family interested in rehab
BOB protocol  thiamine, folate, multivitamin  Ativan for sx-triggered therapy   SBIRT   SW consult   family interested in rehab
Cr 2.85  pt without hx of CKD   likely in setting of hypovolemia/ severe acidosis  c/w IVF as above  Avoid nephrotoxins
Cr 2.85  pt without hx of CKD   likely in setting of hypovolemia/ severe acidosis  c/w IVF as above  Avoid nephrotoxins  Resolved

## 2023-05-11 NOTE — PROGRESS NOTE ADULT - PROBLEM SELECTOR PLAN 2
pt p/w n/v abdominal pain   VS as above   Lipase 14k  f/u CT a/p  c/w IVF and pain control  started on clear liquid diet
pt p/w n/v abdominal pain   VS as above     Bicarb 9  ABG <7  s/p 2 amps bicarb and 2L in ED    started on bicarb drip  started on CIWA protocol  currently no AG gap  if has symptoms of withdrawal, started on phenobarbital  Resolved
pt p/w n/v abdominal pain   VS as above   Lipase 14k  f/u CT a/p  c/w IVF and pain control  started on clear liquid diet
pt p/w n/v abdominal pain   VS as above     Bicarb 9  ABG <7  s/p 2 amps bicarb and 2L in ED    started on bicarb drip  started on CIWA protocol  currently no AG gap  if has symptoms of withdrawal, started on phenobarbital  Resolved

## 2023-05-11 NOTE — PROGRESS NOTE ADULT - SUBJECTIVE AND OBJECTIVE BOX
Patient is a 43y old  Male who presents with a chief complaint of Pancreatitis, Alcoholic Ketosis (10 May 2023 12:03)    pt seen in icu [ x ], reg med floor [   ], bed [ x ], chair at bedside [   ], a + O x3 [  ],   aggitated  [x ],  nad [ x ]      Allergies    No Known Allergies        Vitals    T(F): 100.1 (05-11-23 @ 05:00), Max: 100.5 (05-11-23 @ 00:00)  HR: 110 (05-11-23 @ 05:00) (64 - 131)  BP: 123/78 (05-11-23 @ 05:00) (88/54 - 125/96)  RR: 21 (05-11-23 @ 05:00) (18 - 30)  SpO2: 100% (05-11-23 @ 05:00) (77% - 100%)  Wt(kg): --  CAPILLARY BLOOD GLUCOSE      POCT Blood Glucose.: 124 mg/dL (10 May 2023 11:35)      Labs                          9.5    7.30  )-----------( 87       ( 11 May 2023 04:19 )             26.8       05-11    134<L>  |  102  |  8   ----------------------------<  134<H>  3.3<L>   |  30  |  0.51    Ca    8.1<L>      11 May 2023 04:19  Phos  2.3     05-11  Mg     1.9     05-11    TPro  5.4<L>  /  Alb  2.4<L>  /  TBili  0.6  /  DBili  x   /  AST  148<H>  /  ALT  73<H>  /  AlkPhos  101  05-11            .Blood Blood-Peripheral  05-07 @ 16:00   No growth to date.  --  --      .Blood Blood-Peripheral  05-07 @ 15:50   No growth to date.  --  --          Radiology Results      Meds    MEDICATIONS  (STANDING):  enoxaparin Injectable 40 milliGRAM(s) SubCutaneous every 24 hours  folic acid 1 milliGRAM(s) Oral daily  multivitamin 1 Tablet(s) Oral daily  mupirocin 2% Ointment 1 Application(s) Both Nostrils two times a day      MEDICATIONS  (PRN):  acetaminophen     Tablet .. 650 milliGRAM(s) Oral every 6 hours PRN Temp greater or equal to 38C (100.4F), Mild Pain (1 - 3)  traMADol 25 milliGRAM(s) Oral every 6 hours PRN Moderate Pain (4 - 6)      Physical Exam    Neuro :  no focal deficits  Respiratory: CTA B/L  CV: RRR, S1S2, no murmurs,   Abdominal: Soft, NT, ND +BS,  Extremities: No edema, + peripheral pulses    ASSESSMENT    alcohol withdrawal  alcoholic keto acidosis,   alcoholic pancreatitis,   alcohol abuse,   edvin,   h/o EtOH abuse   tobacco dependence          PLAN    on return to icu pt started on phenobarb load 260mg IV on 5/9   pt now on precedex  ciwa protocol,   folic acid, thiamine, multivitamin,   ct abd pelv with Acute pancreatitis. Hepatic steatosis noted    serum creat wnl   supplement potassium for hypokalemia  ivf  cont protonix    cont current meds   mgmt as per icu          Patient is a 43y old  Male who presents with a chief complaint of Pancreatitis, Alcoholic Ketosis (10 May 2023 12:03)    pt seen in icu [ x ], reg med floor [   ], bed [ x ], chair at bedside [   ], a + O x3 [ x ],   aggitated  [ ],  nad [ x ]      Allergies    No Known Allergies        Vitals    T(F): 100.1 (05-11-23 @ 05:00), Max: 100.5 (05-11-23 @ 00:00)  HR: 110 (05-11-23 @ 05:00) (64 - 131)  BP: 123/78 (05-11-23 @ 05:00) (88/54 - 125/96)  RR: 21 (05-11-23 @ 05:00) (18 - 30)  SpO2: 100% (05-11-23 @ 05:00) (77% - 100%)  Wt(kg): --  CAPILLARY BLOOD GLUCOSE      POCT Blood Glucose.: 124 mg/dL (10 May 2023 11:35)      Labs                          9.5    7.30  )-----------( 87       ( 11 May 2023 04:19 )             26.8       05-11    134<L>  |  102  |  8   ----------------------------<  134<H>  3.3<L>   |  30  |  0.51    Ca    8.1<L>      11 May 2023 04:19  Phos  2.3     05-11  Mg     1.9     05-11    TPro  5.4<L>  /  Alb  2.4<L>  /  TBili  0.6  /  DBili  x   /  AST  148<H>  /  ALT  73<H>  /  AlkPhos  101  05-11            .Blood Blood-Peripheral  05-07 @ 16:00   No growth to date.  --  --      .Blood Blood-Peripheral  05-07 @ 15:50   No growth to date.  --  --          Radiology Results      Meds    MEDICATIONS  (STANDING):  enoxaparin Injectable 40 milliGRAM(s) SubCutaneous every 24 hours  folic acid 1 milliGRAM(s) Oral daily  multivitamin 1 Tablet(s) Oral daily  mupirocin 2% Ointment 1 Application(s) Both Nostrils two times a day      MEDICATIONS  (PRN):  acetaminophen     Tablet .. 650 milliGRAM(s) Oral every 6 hours PRN Temp greater or equal to 38C (100.4F), Mild Pain (1 - 3)  traMADol 25 milliGRAM(s) Oral every 6 hours PRN Moderate Pain (4 - 6)      Physical Exam    Neuro :  no focal deficits  Respiratory: CTA B/L  CV: RRR, S1S2, no murmurs,   Abdominal: Soft, NT, ND +BS,  Extremities: No edema, + peripheral pulses    ASSESSMENT    alcohol withdrawal  s/p alcoholic keto acidosis,   alcoholic pancreatitis,   alcohol abuse,   s/p edvin,   h/o EtOH abuse   tobacco dependence          PLAN    on return to icu pt started on phenobarb load 260mg IV on 5/9   precedex d/c'd  ciwa protocol,   folic acid, thiamine, multivitamin,   soc wk f/u   family interested in etoh rehab but patient would need to agree to receive inpatient substance use treatment prior to SW making an active referral  ct abd pelv with Acute pancreatitis. Hepatic steatosis noted    serum creat wnl   supplement potassium for hypokalemia  ivf   cont protonix    cont current meds   mgmt as per icu

## 2023-05-11 NOTE — PROGRESS NOTE ADULT - PROBLEM SELECTOR PROBLEM 3
SRINIVAS (acute kidney injury)
Alcoholic pancreatitis
SRINIVAS (acute kidney injury)
Alcoholic pancreatitis

## 2023-05-11 NOTE — PROGRESS NOTE ADULT - PROBLEM SELECTOR PLAN 1
pt p/w n/v abdominal pain   VS as above     Bicarb 9  ABG <7  s/p 2 amps bicarb and 2L in ED    started on bicarb drip  started on CIWA protocol  currently no AG gap  if has symptoms of withdrawal will be started on phenobarbital
pt p/w n/v abdominal pain   VS as above     Bicarb 9  ABG <7  s/p 2 amps bicarb and 2L in ED    started on bicarb drip  started on CIWA protocol  currently no AG gap  if has symptoms of withdrawal will be started on phenobarbital
Pt with alcohol ketoacidosis  now in withdrawls   CIWA was > 8  s/p 780mg of phenobarbital   c/w phenobarb 130mg If CIWA >8  pt currently calm
Pt with alcohol ketoacidosis  now in withdrawls   CIWA was > 8  s/p 780mg of phenobarbital   c/w phenobarb 130mg If CIWA >8  pt currently calm

## 2023-05-11 NOTE — DISCHARGE NOTE PROVIDER - DETAILS OF MALNUTRITION DIAGNOSIS/DIAGNOSES
This patient has been assessed with a concern for Malnutrition and was treated during this hospitalization for the following Nutrition diagnosis/diagnoses:     -  05/09/2023: Moderate protein-calorie malnutrition

## 2023-05-11 NOTE — PROGRESS NOTE ADULT - ATTENDING COMMENTS
42yo man w/ PMH EtOH abuse presenting w/ abd pain, N/V inability to tolerate PO; admitted for EtOH withdrawal and pancreatitis, likely EtOH-pancreatitis     ASSESSMENT:  #EtOH ketoacidosis  #EtOH pancreatitis  #EtOH withdrawal  #Acute renal failure  #Hypophosphatemia  #Lactic acidosis  #Thrombocytopenia    Plan:  - cont CIWA protocol, phenobarb approach but has not required PRNs thus far based on CIWA; remains comfortable  - hemodynamic monitoring  - f/u peripheral ID workup, low suspicion for sepsis at this time  - advance diet, tolerated clears i/s/o pancreatitis  - trend electrolytes closely, K/Phos repletion  - renal fxn improving, monitor SCr and UOP and I/O  - monitor CBC, PLT and anemia suspect low 2/2 EtOH; watch while on DVT PPx  - MVI, thiamine repletion  - GI and DVT PPx    Transfer to medicine
42yo man w/ PMH EtOH abuse presenting w/ abd pain, N/V inability to tolerate PO; admitted for EtOH withdrawal and pancreatitis, likely EtOH-pancreatitis     ASSESSMENT:  #EtOH ketoacidosis  #EtOH pancreatitis  #EtOH withdrawal  #Acute renal failure  #Hypophosphatemia  #Lactic acidosis  #Thrombocytopenia    Plan:  - cont CIWA protocol, will switch from benzo-based approach to phenobarb protocol if needed - currently comfortable  - hemodynamic monitoring  - f/u peripheral ID workup, low suspicion for sepsis at this time  - drop standing IVF, trial of CLD for pancreatitis - pt endorsing some appetite  - trend electrolytes closely, K/Phos repletion  - renal fxn improving, monitor SCr and UOP and I/O  - monitor CBC, PLT and anemia suspect low 2/2 EtOH; watch while on DVT PPx  - MVI, thiamine repletion  - GI and DVT PPx  - cont ICU care
42yo man w/ PMH EtOH abuse presenting w/ abd pain, N/V inability to tolerate PO; admitted for EtOH withdrawal and pancreatitis, likely EtOH-pancreatitis; returned to ICU after downgrade 5/9 overnight d/t acute agitation.     ASSESSMENT:  #EtOH ketoacidosis  #EtOH pancreatitis  #EtOH withdrawal  #Acute renal failure  #Hypophosphatemia  #Lactic acidosis  #Thrombocytopenia    Plan:  - cont CIWA monitoring and phenobarb for CIWA>8, remains without overt withdrawal sx and mentation improving  - peripheral ID workup NGTD  - advanced diet to regular  - trend electrolytes closely, K/Phos repletion  - renal fxn improving, monitor SCr and UOP and I/O  - monitor CBC, PLT and anemia suspect low 2/2 EtOH, plts improving  - MVI, folate; s/p thiamine repletion  - GI and DVT PPx  - OOBTC/ambulate    Transfer to medicine
44yo man w/ PMH EtOH abuse presenting w/ abd pain, N/V inability to tolerate PO; admitted for EtOH withdrawal and pancreatitis, likely EtOH-pancreatitis; returned to ICU after downgrade 5/9 overnight d/t acute agitation.     ASSESSMENT:  #EtOH ketoacidosis  #EtOH pancreatitis  #EtOH withdrawal  #Acute renal failure  #Hypophosphatemia  #Lactic acidosis  #Thrombocytopenia    Plan:  - had not required PRNs for CIWA prior to ICU downgrade, now s/p phenobarb after ICU re-admission, cont CIWA monitoring and phenobarb for CIWA>8  - stop precedex gtt   - hemodynamic monitoring  - f/u peripheral ID workup, low suspicion for sepsis at this time  - advance diet, tolerated clears i/s/o pancreatitis  - trend electrolytes closely, K/Phos repletion  - renal fxn improving, monitor SCr and UOP and I/O  - monitor CBC, PLT and anemia suspect low 2/2 EtOH; watch while on DVT PPx  - MVI, thiamine repletion  - GI and DVT PPx

## 2023-05-11 NOTE — PROGRESS NOTE ADULT - PROBLEM SELECTOR PLAN 3
Cr 2.85  pt without hx of CKD   likely in setting of hypovolemia/ severe acidosis  c/w IVF as above  Avoid nephrotoxins
pt p/w n/v abdominal pain   VS as above   Lipase 14k  f/u CT a/p  c/w IVF and pain control  started on regular diet
pt p/w n/v abdominal pain   VS as above   Lipase 14k  f/u CT a/p  c/w IVF and pain control  started on regular diet  tolerating well, denies pain on PE
Cr 2.85  pt without hx of CKD   likely in setting of hypovolemia/ severe acidosis  c/w IVF as above  Avoid nephrotoxins

## 2023-05-11 NOTE — DISCHARGE NOTE PROVIDER - HOSPITAL COURSE
Patient is a 44 yo with no prior medical history, hx of Alcohol abuse and tobacco dependence, p/w abdominal pain and vomiting, patient admitted for Alcoholic ketoacidosis and pancreatitis. Endorses epigastric pain, no radiation, last drink was yesterday, drank about 1/2 bottle of vodka yesterday. Cousin at bedside says patient last visited a doctor 3 years ago, lives with his elderly mother (who is unwell, cannot walk or care for herself). No hx of any other drug use. ICU consulted for close hemodynamic monitoring givens severe lactic acidosis with HAGMA. Admitted for pancreatitis with severe lactic acidosis HAGMA + alcoholic ketoacidosis. In the icu pts lactate improved. Pts gap closed was no long at risk for ketoacidosis. Pt was switched from ativan to phenobarbital if CIWA >8 and he starts to be in alcohol withdrawal. Pts diet was upgraded to clear liquids and pt tolerated well. pt had episodes of hypophosphatemia, was repleated, with serial bmps. Pt had remains slightly tachycardic, likely from his alcohol withdrawals. Pts plt low today, dvt proph was paused. Pt was downgraded to a medicine unit. overnight he became agitated and was going through withdrawals. He received a total of 780mg of phenobarbital in increments of 15 minutes because his CIWA score was above 8. Pt began to feel better and was no longer agitated. Is currently out of the window of withdrawals. His diet was upgraded. Patient is able to ambulate and tolerate diet prior to discharge. Patient is stable for discharge per attending and is advised to follow up with PCP as outpatient. Please refer to patient's complete medical chart with documents for a full hospital course, for this is only a brief summary.     Patient is a 42 yo with no prior medical history, hx of Alcohol abuse and tobacco dependence, p/w abdominal pain and vomiting, patient admitted for Alcoholic ketoacidosis and pancreatitis. Endorses epigastric pain, no radiation, last drink was yesterday, drank about 1/2 bottle of vodka yesterday. Cousin at bedside says patient last visited a doctor 3 years ago, lives with his elderly mother (who is unwell, cannot walk or care for herself). No hx of any other drug use. ICU consulted for close hemodynamic monitoring givens severe lactic acidosis with HAGMA. Admitted for pancreatitis with severe lactic acidosis HAGMA + alcoholic ketoacidosis. In the icu pts lactate improved. Pts gap closed was no long at risk for ketoacidosis. Pt was switched from ativan to phenobarbital if CIWA >8 and he starts to be in alcohol withdrawal. Pts diet was upgraded to clear liquids and pt tolerated well. pt had episodes of hypophosphatemia, was repleated, with serial bmps. Pt had remains slightly tachycardic, likely from his alcohol withdrawals. Pts plt low today, dvt proph was paused. Pt was downgraded to a medicine unit. overnight he became agitated and was going through withdrawals. He received a total of 780mg of phenobarbital in increments of 15 minutes because his CIWA score was above 8. Pt began to feel better and was no longer agitated. Today pt is calm denies any pain, headache. CIWA score is 0. Is currently out of the window for withdrawals. His diet was upgraded and he tolerated it well. Stated that he and his family are looking into for a rehab center. Patient is able to ambulate and tolerate diet prior to discharge. Patient is stable for discharge per attending and is advised to follow up with PCP as outpatient. Please refer to patient's complete medical chart with documents for a full hospital course, for this is only a brief summary.     Patient is a 44 yo with no prior medical history, hx of Alcohol abuse and tobacco dependence, p/w abdominal pain and vomiting, patient admitted for Alcoholic ketoacidosis and pancreatitis. Endorses epigastric pain, no radiation, last drink was yesterday, drank about 1/2 bottle of vodka yesterday. Cousin at bedside says patient last visited a doctor 3 years ago, lives with his elderly mother (who is unwell, cannot walk or care for herself). No hx of any other drug use. ICU consulted for close hemodynamic monitoring givens severe lactic acidosis with HAGMA. Admitted for pancreatitis with severe lactic acidosis HAGMA + alcoholic ketoacidosis. In the icu pts lactate improved. Pts gap closed was no long at risk for ketoacidosis. Pt was switched from ativan to phenobarbital if CIWA >8 and he starts to be in alcohol withdrawal. Pts diet was upgraded to clear liquids and pt tolerated well. pt had episodes of hypophosphatemia, was repleated, with serial bmps. Pt had remains slightly tachycardic, likely from his alcohol withdrawals. Pts plt low today, dvt proph was paused. Pt was downgraded to a medicine unit. overnight he became agitated and was going through withdrawals. He received a total of 780mg of phenobarbital in increments of 15 minutes because his CIWA score was above 8. Pt began to feel better and was no longer agitated. Pt had multiple episodes of electrolyte imbalances which were replaced. Today pt is calm denies any pain, headache. CIWA score is 0. Is currently out of the window for withdrawals. His diet was upgraded and he tolerated it well. Stated that he and his family are looking into for a rehab center. Patient is able to ambulate and tolerate diet prior to discharge. Patient is stable for discharge per attending and is advised to follow up with PCP as outpatient. Please refer to patient's complete medical chart with documents for a full hospital course, for this is only a brief summary.     Patient is a 42 yo with no prior medical history, hx of Alcohol abuse and tobacco dependence, p/w abdominal pain and vomiting, patient admitted for Alcoholic ketoacidosis and pancreatitis. Endorses epigastric pain, no radiation, last drink was yesterday, drank about 1/2 bottle of vodka yesterday. Cousin at bedside says patient last visited a doctor 3 years ago, lives with his elderly mother (who is unwell, cannot walk or care for herself). No hx of any other drug use. ICU consulted for close hemodynamic monitoring givens severe lactic acidosis with HAGMA. Admitted for pancreatitis with severe lactic acidosis HAGMA + alcoholic ketoacidosis. In the icu pts lactate improved. Pts gap closed was no long at risk for ketoacidosis. Pt was switched from ativan to phenobarbital if CIWA >8 and he starts to be in alcohol withdrawal. Pts diet was upgraded to clear liquids and pt tolerated well. pt had episodes of hypophosphatemia, was repleated, with serial bmps. Pt had remains slightly tachycardic, likely from his alcohol withdrawals. Pts plt low today, dvt proph was paused. Pt was downgraded to a medicine unit. overnight he became agitated and was going through withdrawals. He received a total of 780mg of phenobarbital in increments of 15 minutes because his CIWA score was above 8. Pt began to feel better and was no longer agitated. Pt had multiple episodes of electrolyte imbalances which were replaced. Today pt is calm denies any pain, headache. CIWA score is 0. Is currently out of the window for withdrawals. His diet was upgraded and he tolerated it well. Stated that he and his family are looking into for a rehab center.  Called by: Nurse pt wants to leave AMA  AMA reason: Tachycardia , etiology not clear      Explanation of Leaving AMA:   Throughout our interactions the patient has demonstrated concrete thinking/reasoning, has maintained an orderly/reasonable conversation, appears to have intact insight/judgment/reason, is A+Ox4, and appears of a sound mind and therefore in our opinion, does not have an indication to presume lack of capacity. All medical information and discussions were communicated (in laymen's terms), including the teams' clinical concerns.  The patient verbalized an understanding of our worries completing a teach back.  We’ve communicated to the patient that their hospital course is incomplete and there is the potential that conditions that are threatening to life have not been ruled out. Our discussions included the potential outcomes of leaving AMA, including worsening of their condition, becoming permanently disabled/in pain/critically ill, or death.  Despite these efforts, we were unable to convince the patient to stay. The patient is refusing any further care and is leaving against medical advice. Patient was provided with follow up care. Patient was informed that they may call 911 or return to the hospital via the ED at any time.

## 2023-05-11 NOTE — DISCHARGE NOTE PROVIDER - CARE PROVIDER_API CALL
Isaac Henley  INTERNAL MEDICINE  [address] [place] [address]  Phone: [phone]  Fax: [phone]  Established Patient  Follow Up Time: 1 week

## 2023-05-11 NOTE — PROGRESS NOTE ADULT - SUBJECTIVE AND OBJECTIVE BOX
Patient is a 43y old  Male who presents with a chief complaint of Pancreatitis, Alcoholic Ketosis (11 May 2023 13:09)    INTERVAL HISTORY/ OVERNIGHT EVENTS: Patient was examined while he was lying in bed, pt is Aox3 (time, place, person), responding to questions/commands, in NAD. He denies headache, weakness, cough, chest pain, wheezing, abdominal pain. Pt is having bowel movements, is also voiding with no issues. He is tolerating a regular diet with no nausea or vomiting. CIWA is 0 in no apparent evidence of withdrawals.       PRESSORS: [ ] YES [ ] NO  WHICH:    ANTIBIOTICS:                  DATE STARTED:  ANTIBIOTICS:                  DATE STARTED:  ANTIBIOTICS:                  DATE STARTED:    Antimicrobial:    Cardiovascular:    Pulmonary:    Hematalogic:  enoxaparin Injectable 40 milliGRAM(s) SubCutaneous every 24 hours    Other:  acetaminophen     Tablet .. 650 milliGRAM(s) Oral every 6 hours PRN  chlorhexidine 2% Cloths 1 Application(s) Topical <User Schedule>  folic acid 1 milliGRAM(s) Oral daily  multivitamin 1 Tablet(s) Oral daily  mupirocin 2% Ointment 1 Application(s) Both Nostrils two times a day  traMADol 25 milliGRAM(s) Oral every 6 hours PRN    acetaminophen     Tablet .. 650 milliGRAM(s) Oral every 6 hours PRN  chlorhexidine 2% Cloths 1 Application(s) Topical <User Schedule>  enoxaparin Injectable 40 milliGRAM(s) SubCutaneous every 24 hours  folic acid 1 milliGRAM(s) Oral daily  multivitamin 1 Tablet(s) Oral daily  mupirocin 2% Ointment 1 Application(s) Both Nostrils two times a day  traMADol 25 milliGRAM(s) Oral every 6 hours PRN    Drug Dosing Weight  Height (cm): 182.8 (09 May 2023 12:00)  Weight (kg): 75.4 (09 May 2023 21:50)  BMI (kg/m2): 22.6 (09 May 2023 21:50)  BSA (m2): 1.97 (09 May 2023 21:50)    CENTRAL LINE: [ ] YES [ ] NO  LOCATION:     PRATER: [ ] YES [ ] NO      A-LINE:  [ ] YES [ ] NO  LOCATION:         ICU Vital Signs Last 24 Hrs  T(C): 37.8 (11 May 2023 05:00), Max: 38.1 (11 May 2023 00:00)  T(F): 100.1 (11 May 2023 05:00), Max: 100.5 (11 May 2023 00:00)  HR: 121 (11 May 2023 08:00) (95 - 131)  BP: 119/80 (11 May 2023 07:00) (88/54 - 123/78)  BP(mean): 92 (11 May 2023 07:00) (65 - 96)  ABP: --  ABP(mean): --  RR: 18 (11 May 2023 08:00) (18 - 30)  SpO2: 100% (11 May 2023 06:00) (98% - 100%)              05-10 @ 07:01  -  05-11 @ 07:00  --------------------------------------------------------  IN: 1624.5 mL / OUT: 1250 mL / NET: 374.5 mL            PHYSICAL EXAM:    GENERAL: NAD, well-groomed, well-developed  EYES: EOMI, PERRLA,   NECK: Supple, No JVD; Normal thyroid; Trachea midline  NERVOUS SYSTEM:  Alert & Oriented X3,  Motor Strength 5/5 B/L upper and lower extremities; DTRs 2+ intact and symmetric  CHEST/LUNG: No rales, rhonchi, wheezing   HEART: Regular rate and rhythm; No murmurs,   ABDOMEN: Soft, Nontender, Nondistended; Bowel sounds present  EXTREMITIES:  2+ Peripheral Pulses, No clubbing, cyanosis, or edema      LABS:                        9.5    7.30  )-----------( 87       ( 11 May 2023 04:19 )             26.8     05-11    134<L>  |  102  |  8   ----------------------------<  134<H>  3.3<L>   |  30  |  0.51    Ca    8.1<L>      11 May 2023 04:19  Phos  2.3     05-11  Mg     1.9     05-11    TPro  5.4<L>  /  Alb  2.4<L>  /  TBili  0.6  /  DBili  x   /  AST  148<H>  /  ALT  73<H>  /  AlkPhos  101  05-11        CAPILLARY BLOOD GLUCOSE            RADIOLOGY & ADDITIONAL STUDIES REVIEWED:  ***

## 2023-05-11 NOTE — PROGRESS NOTE ADULT - PROBLEM SELECTOR PLAN 5
HSQ for DVT ppx
BOB protocol  thiamine, folate, multivitamin  Ativan for sx-triggered therapy   SBIRT   SW consult   family interested in rehab
HSQ for DVT ppx
BOB protocol  thiamine, folate, multivitamin  Ativan for sx-triggered therapy   SBIRT   SW consult   family interested in rehab

## 2023-05-11 NOTE — DISCHARGE NOTE PROVIDER - NSDCCPCAREPLAN_GEN_ALL_CORE_FT
PRINCIPAL DISCHARGE DIAGNOSIS  Diagnosis: Alcoholic ketoacidosis  Assessment and Plan of Treatment: You presented to the hospital with nausea and vomitting. We did some tests and it showed your blood alcohol level to be very high. You were also going into ketoacidosis which is a biproduct of alcohol which can be dangerous. You received IV fluids and IV vitamins. You were also monitored for withdrawals seizures. You did have episodes of withdrawals, and you received medication to relieve the symptoms and help you sleep. Avoid drinking alcohol. Set a goal for yourself to completely stop drinking. This will stop you from being dependent on it. This will also prevent you from developing alcohol withdrawal and other more serious health problems. Ask your caregiver if you should more of other liquids, such as water. Avoid caffeine, which may be found in coffee, tea, and sports drinks. Please follow up with your primary care doctor.      SECONDARY DISCHARGE DIAGNOSES  Diagnosis: Alcoholic pancreatitis  Assessment and Plan of Treatment: You presented with abdominal pain and vomitting. You had an elevated pancreatic enzyme level. Please avoid drinking alcohol and follow up with your primary care doctor.     PRINCIPAL DISCHARGE DIAGNOSIS  Diagnosis: Alcoholic ketoacidosis  Assessment and Plan of Treatment: You presented to the hospital with nausea and vomitting. We did some tests and it showed your blood alcohol level to be very high. Alcoholic ketoacidosis is the buildup of ketones in the blood due to alcohol use. Ketones are a type of acid that form when the body breaks down fat for energy. The condition is an acute form of metabolic acidosis, a condition in which there is too much acid in body fluids.. You received IV fluids and IV vitamins to help decrease the acid levels which were critical. You were also monitored for withdrawals seizures. You did have episodes of withdrawals, and you received medication to relieve the symptoms and help you sleep. Avoid drinking alcohol. Set a goal for yourself to completely stop drinking. This will stop you from being dependent on it. This will also prevent you from developing alcohol withdrawal and other more serious health problems. Ask your caregiver if you should more of other liquids, such as water. Avoid caffeine, which may be found in coffee, tea, and sports drinks. Please follow up with your primary care doctor. Please follow up with a cardiologist for your elevated heart rate during this admission.      SECONDARY DISCHARGE DIAGNOSES  Diagnosis: Alcoholic pancreatitis  Assessment and Plan of Treatment: You presented with abdominal pain and vomitting. You had an elevated pancreatic enzyme level. Please avoid drinking alcohol and follow up with your primary care doctor.

## 2023-05-12 VITALS
HEART RATE: 112 BPM | DIASTOLIC BLOOD PRESSURE: 77 MMHG | TEMPERATURE: 100 F | SYSTOLIC BLOOD PRESSURE: 119 MMHG | RESPIRATION RATE: 18 BRPM | OXYGEN SATURATION: 99 %

## 2023-05-12 LAB
ALBUMIN SERPL ELPH-MCNC: 2.5 G/DL — LOW (ref 3.5–5)
ALP SERPL-CCNC: 100 U/L — SIGNIFICANT CHANGE UP (ref 40–120)
ALT FLD-CCNC: 96 U/L DA — HIGH (ref 10–60)
ANION GAP SERPL CALC-SCNC: 4 MMOL/L — LOW (ref 5–17)
AST SERPL-CCNC: 132 U/L — HIGH (ref 10–40)
BILIRUB SERPL-MCNC: 0.5 MG/DL — SIGNIFICANT CHANGE UP (ref 0.2–1.2)
BUN SERPL-MCNC: 9 MG/DL — SIGNIFICANT CHANGE UP (ref 7–18)
CALCIUM SERPL-MCNC: 8.2 MG/DL — LOW (ref 8.4–10.5)
CHLORIDE SERPL-SCNC: 104 MMOL/L — SIGNIFICANT CHANGE UP (ref 96–108)
CO2 SERPL-SCNC: 27 MMOL/L — SIGNIFICANT CHANGE UP (ref 22–31)
CREAT SERPL-MCNC: 0.44 MG/DL — LOW (ref 0.5–1.3)
CULTURE RESULTS: SIGNIFICANT CHANGE UP
CULTURE RESULTS: SIGNIFICANT CHANGE UP
EGFR: 136 ML/MIN/1.73M2 — SIGNIFICANT CHANGE UP
GLUCOSE SERPL-MCNC: 129 MG/DL — HIGH (ref 70–99)
HCT VFR BLD CALC: 27.4 % — LOW (ref 39–50)
HGB BLD-MCNC: 9.4 G/DL — LOW (ref 13–17)
MAGNESIUM SERPL-MCNC: 1.9 MG/DL — SIGNIFICANT CHANGE UP (ref 1.6–2.6)
MCHC RBC-ENTMCNC: 29.2 PG — SIGNIFICANT CHANGE UP (ref 27–34)
MCHC RBC-ENTMCNC: 34.3 GM/DL — SIGNIFICANT CHANGE UP (ref 32–36)
MCV RBC AUTO: 85.1 FL — SIGNIFICANT CHANGE UP (ref 80–100)
NRBC # BLD: 0 /100 WBCS — SIGNIFICANT CHANGE UP (ref 0–0)
PHOSPHATE SERPL-MCNC: 3.5 MG/DL — SIGNIFICANT CHANGE UP (ref 2.5–4.5)
PLATELET # BLD AUTO: 123 K/UL — LOW (ref 150–400)
POTASSIUM SERPL-MCNC: 4 MMOL/L — SIGNIFICANT CHANGE UP (ref 3.5–5.3)
POTASSIUM SERPL-SCNC: 4 MMOL/L — SIGNIFICANT CHANGE UP (ref 3.5–5.3)
PROT SERPL-MCNC: 5.9 G/DL — LOW (ref 6–8.3)
RBC # BLD: 3.22 M/UL — LOW (ref 4.2–5.8)
RBC # FLD: 18.4 % — HIGH (ref 10.3–14.5)
SODIUM SERPL-SCNC: 135 MMOL/L — SIGNIFICANT CHANGE UP (ref 135–145)
SPECIMEN SOURCE: SIGNIFICANT CHANGE UP
SPECIMEN SOURCE: SIGNIFICANT CHANGE UP
WBC # BLD: 7.86 K/UL — SIGNIFICANT CHANGE UP (ref 3.8–10.5)
WBC # FLD AUTO: 7.86 K/UL — SIGNIFICANT CHANGE UP (ref 3.8–10.5)

## 2023-05-12 PROCEDURE — 82330 ASSAY OF CALCIUM: CPT

## 2023-05-12 PROCEDURE — 82693 ASSAY OF ETHYLENE GLYCOL: CPT

## 2023-05-12 PROCEDURE — 80053 COMPREHEN METABOLIC PANEL: CPT

## 2023-05-12 PROCEDURE — 83930 ASSAY OF BLOOD OSMOLALITY: CPT

## 2023-05-12 PROCEDURE — 93005 ELECTROCARDIOGRAM TRACING: CPT

## 2023-05-12 PROCEDURE — 80307 DRUG TEST PRSMV CHEM ANLYZR: CPT

## 2023-05-12 PROCEDURE — 85730 THROMBOPLASTIN TIME PARTIAL: CPT

## 2023-05-12 PROCEDURE — 96375 TX/PRO/DX INJ NEW DRUG ADDON: CPT

## 2023-05-12 PROCEDURE — 81001 URINALYSIS AUTO W/SCOPE: CPT

## 2023-05-12 PROCEDURE — 74176 CT ABD & PELVIS W/O CONTRAST: CPT

## 2023-05-12 PROCEDURE — 82803 BLOOD GASES ANY COMBINATION: CPT

## 2023-05-12 PROCEDURE — 36415 COLL VENOUS BLD VENIPUNCTURE: CPT

## 2023-05-12 PROCEDURE — 85027 COMPLETE CBC AUTOMATED: CPT

## 2023-05-12 PROCEDURE — 87040 BLOOD CULTURE FOR BACTERIA: CPT

## 2023-05-12 PROCEDURE — 82553 CREATINE MB FRACTION: CPT

## 2023-05-12 PROCEDURE — 87640 STAPH A DNA AMP PROBE: CPT

## 2023-05-12 PROCEDURE — 71045 X-RAY EXAM CHEST 1 VIEW: CPT

## 2023-05-12 PROCEDURE — 82550 ASSAY OF CK (CPK): CPT

## 2023-05-12 PROCEDURE — 83735 ASSAY OF MAGNESIUM: CPT

## 2023-05-12 PROCEDURE — 84100 ASSAY OF PHOSPHORUS: CPT

## 2023-05-12 PROCEDURE — 84295 ASSAY OF SERUM SODIUM: CPT

## 2023-05-12 PROCEDURE — 99285 EMERGENCY DEPT VISIT HI MDM: CPT | Mod: 25

## 2023-05-12 PROCEDURE — 80048 BASIC METABOLIC PNL TOTAL CA: CPT

## 2023-05-12 PROCEDURE — 84478 ASSAY OF TRIGLYCERIDES: CPT

## 2023-05-12 PROCEDURE — 85610 PROTHROMBIN TIME: CPT

## 2023-05-12 PROCEDURE — 82570 ASSAY OF URINE CREATININE: CPT

## 2023-05-12 PROCEDURE — 82962 GLUCOSE BLOOD TEST: CPT

## 2023-05-12 PROCEDURE — 99239 HOSP IP/OBS DSCHRG MGMT >30: CPT | Mod: GC

## 2023-05-12 PROCEDURE — 83690 ASSAY OF LIPASE: CPT

## 2023-05-12 PROCEDURE — 87641 MR-STAPH DNA AMP PROBE: CPT

## 2023-05-12 PROCEDURE — 84300 ASSAY OF URINE SODIUM: CPT

## 2023-05-12 PROCEDURE — 96365 THER/PROPH/DIAG IV INF INIT: CPT

## 2023-05-12 PROCEDURE — 85025 COMPLETE CBC W/AUTO DIFF WBC: CPT

## 2023-05-12 PROCEDURE — 83605 ASSAY OF LACTIC ACID: CPT

## 2023-05-12 PROCEDURE — 84132 ASSAY OF SERUM POTASSIUM: CPT

## 2023-05-12 PROCEDURE — G0480: CPT

## 2023-05-12 PROCEDURE — 84484 ASSAY OF TROPONIN QUANT: CPT

## 2023-05-12 PROCEDURE — 87086 URINE CULTURE/COLONY COUNT: CPT

## 2023-05-12 RX ADMIN — CHLORHEXIDINE GLUCONATE 1 APPLICATION(S): 213 SOLUTION TOPICAL at 04:51

## 2023-05-12 RX ADMIN — TRAMADOL HYDROCHLORIDE 25 MILLIGRAM(S): 50 TABLET ORAL at 02:05

## 2023-05-12 RX ADMIN — TRAMADOL HYDROCHLORIDE 25 MILLIGRAM(S): 50 TABLET ORAL at 01:17

## 2023-05-12 NOTE — CHART NOTE - NSCHARTNOTEFT_GEN_A_CORE
Called by: Nurse      Plan of Care/Events Leading up to AMA: Outpatient Rehab, OP follow up for tachycardia and f/u Hepatology for hepatic steatosis      Explanation of Leaving AMA:  Throughout our interactions the patient has demonstrated concrete thinking/reasoning, has maintained an orderly/reasonable conversation, appears to have intact insight/judgment/reason, is A+Ox4, and appears of a sound mind and therefore in our opinion, does not have an indication to presume lack of capacity. All medical information and discussions were communicated (in laymen's terms), including the teams' clinical concerns.  The patient verbalized an understanding of our worries completing a teach back.  We’ve communicated to the patient that their hospital course is incomplete and there is the potential that conditions that are threatening to life have not been ruled out. Our discussions included the potential outcomes of leaving AMA, including worsening of their condition, becoming permanently disabled/in pain/critically ill, or death.  Despite these efforts, we were unable to convince the patient to stay. The patient is refusing any further care and is leaving against medical advice. Patient was provided with follow up care. Patient was informed that they may call 911 or return to the hospital via the ED at any time.    Palmer Benítez MD   Department of Medicine (ICU) Called by: Nurse pt wants to leave AMA  AMA reason: Tachycardia , etiology not clear      Explanation of Leaving AMA:   Throughout our interactions the patient has demonstrated concrete thinking/reasoning, has maintained an orderly/reasonable conversation, appears to have intact insight/judgment/reason, is A+Ox4, and appears of a sound mind and therefore in our opinion, does not have an indication to presume lack of capacity. All medical information and discussions were communicated (in laymen's terms), including the teams' clinical concerns.  The patient verbalized an understanding of our worries completing a teach back.  We’ve communicated to the patient that their hospital course is incomplete and there is the potential that conditions that are threatening to life have not been ruled out. Our discussions included the potential outcomes of leaving AMA, including worsening of their condition, becoming permanently disabled/in pain/critically ill, or death.  Despite these efforts, we were unable to convince the patient to stay. The patient is refusing any further care and is leaving against medical advice. Patient was provided with follow up care. Patient was informed that they may call 911 or return to the hospital via the ED at any time.    Palmer Benítez MD   Department of Medicine (ICU)

## 2023-05-12 NOTE — PROGRESS NOTE ADULT - NUTRITIONAL ASSESSMENT
This patient has been assessed with a concern for Malnutrition and has been determined to have a diagnosis/diagnoses of Moderate protein-calorie malnutrition.    This patient is being managed with:   Diet Regular-  DASH/TLC {Sodium & Cholesterol Restricted}  Entered: May  9 2023 11:14AM  

## 2023-05-12 NOTE — PROGRESS NOTE ADULT - REASON FOR ADMISSION
Pancreatitis, Alcoholic Ketosis

## 2023-05-12 NOTE — PROGRESS NOTE ADULT - PROVIDER SPECIALTY LIST ADULT
Internal Medicine
Internal Medicine
Critical Care
Critical Care
Internal Medicine
Internal Medicine
Critical Care
Critical Care
Internal Medicine

## 2023-05-12 NOTE — PROGRESS NOTE ADULT - SUBJECTIVE AND OBJECTIVE BOX
Patient is a 43y old  Male who presents with a chief complaint of Pancreatitis, Alcoholic Ketosis (11 May 2023 14:16)    pt seen in icu [ x ], reg med floor [   ], bed [ x ], chair at bedside [   ], a + O x3 [ x ],   aggitated  [ ],  nad [ x ]        Allergies    No Known Allergies        Vitals    T(F): 97.4 (05-11-23 @ 16:17), Max: 98.8 (05-11-23 @ 08:00)  HR: 101 (05-12-23 @ 01:00) (99 - 121)  BP: 104/62 (05-12-23 @ 01:00) (104/62 - 127/84)  RR: 20 (05-12-23 @ 01:00) (14 - 26)  SpO2: 98% (05-12-23 @ 00:00) (96% - 100%)  Wt(kg): --  CAPILLARY BLOOD GLUCOSE      POCT Blood Glucose.: 124 mg/dL (10 May 2023 11:35)      Labs                          9.4    7.86  )-----------( 123      ( 12 May 2023 03:40 )             27.4       05-12    135  |  104  |  9   ----------------------------<  129<H>  4.0   |  27  |  0.44<L>    Ca    8.2<L>      12 May 2023 03:40  Phos  3.5     05-12  Mg     1.9     05-12    TPro  5.9<L>  /  Alb  2.5<L>  /  TBili  0.5  /  DBili  x   /  AST  132<H>  /  ALT  96<H>  /  AlkPhos  100  05-12            .Blood Blood-Peripheral  05-07 @ 16:00   No growth to date.  --  --      .Blood Blood-Peripheral  05-07 @ 15:50   No growth to date.  --  --          Radiology Results      Meds    MEDICATIONS  (STANDING):  chlorhexidine 2% Cloths 1 Application(s) Topical <User Schedule>  enoxaparin Injectable 40 milliGRAM(s) SubCutaneous every 24 hours  folic acid 1 milliGRAM(s) Oral daily  melatonin 5 milliGRAM(s) Oral at bedtime  multivitamin 1 Tablet(s) Oral daily  mupirocin 2% Ointment 1 Application(s) Both Nostrils two times a day      MEDICATIONS  (PRN):  acetaminophen     Tablet .. 650 milliGRAM(s) Oral every 6 hours PRN Temp greater or equal to 38C (100.4F), Mild Pain (1 - 3)  traMADol 25 milliGRAM(s) Oral every 6 hours PRN Moderate Pain (4 - 6)      Physical Exam    Neuro :  no focal deficits  Respiratory: CTA B/L  CV: RRR, S1S2, no murmurs,   Abdominal: Soft, NT, ND +BS,  Extremities: No edema, + peripheral pulses    ASSESSMENT    alcohol withdrawal  s/p alcoholic keto acidosis,   alcoholic pancreatitis,   alcohol abuse,   s/p edvin,   h/o EtOH abuse   tobacco dependence          PLAN    on return to icu pt started on phenobarb load 260mg IV on 5/9   precedex d/c'd  ciwa protocol,   folic acid, thiamine, multivitamin,   soc wk f/u   family interested in etoh rehab but patient would need to agree to receive inpatient substance use treatment prior to SW making an active referral  ct abd pelv with Acute pancreatitis. Hepatic steatosis noted    serum creat wnl   supplement potassium for hypokalemia  ivf   cont protonix    cont current meds   mgmt as per icu          Patient is a 43y old  Male who presents with a chief complaint of Pancreatitis, Alcoholic Ketosis (11 May 2023 14:16)    pt seen in icu [ x ], reg med floor [   ], bed [ x ], chair at bedside [   ], a + O x3 [ x ],   aggitated  [ ],  nad [ x ]        Allergies    No Known Allergies        Vitals    T(F): 97.4 (05-11-23 @ 16:17), Max: 98.8 (05-11-23 @ 08:00)  HR: 101 (05-12-23 @ 01:00) (99 - 121)  BP: 104/62 (05-12-23 @ 01:00) (104/62 - 127/84)  RR: 20 (05-12-23 @ 01:00) (14 - 26)  SpO2: 98% (05-12-23 @ 00:00) (96% - 100%)  Wt(kg): --  CAPILLARY BLOOD GLUCOSE      POCT Blood Glucose.: 124 mg/dL (10 May 2023 11:35)      Labs                          9.4    7.86  )-----------( 123      ( 12 May 2023 03:40 )             27.4       05-12    135  |  104  |  9   ----------------------------<  129<H>  4.0   |  27  |  0.44<L>    Ca    8.2<L>      12 May 2023 03:40  Phos  3.5     05-12  Mg     1.9     05-12    TPro  5.9<L>  /  Alb  2.5<L>  /  TBili  0.5  /  DBili  x   /  AST  132<H>  /  ALT  96<H>  /  AlkPhos  100  05-12            .Blood Blood-Peripheral  05-07 @ 16:00   No growth to date.  --  --      .Blood Blood-Peripheral  05-07 @ 15:50   No growth to date.  --  --          Radiology Results      Meds    MEDICATIONS  (STANDING):  chlorhexidine 2% Cloths 1 Application(s) Topical <User Schedule>  enoxaparin Injectable 40 milliGRAM(s) SubCutaneous every 24 hours  folic acid 1 milliGRAM(s) Oral daily  melatonin 5 milliGRAM(s) Oral at bedtime  multivitamin 1 Tablet(s) Oral daily  mupirocin 2% Ointment 1 Application(s) Both Nostrils two times a day      MEDICATIONS  (PRN):  acetaminophen     Tablet .. 650 milliGRAM(s) Oral every 6 hours PRN Temp greater or equal to 38C (100.4F), Mild Pain (1 - 3)  traMADol 25 milliGRAM(s) Oral every 6 hours PRN Moderate Pain (4 - 6)      Physical Exam    Neuro :  no focal deficits  Respiratory: CTA B/L  CV: RRR, S1S2, no murmurs,   Abdominal: Soft, NT, ND +BS,  Extremities: No edema, + peripheral pulses    ASSESSMENT    alcohol withdrawal  s/p alcoholic keto acidosis,   alcoholic pancreatitis,   alcohol abuse,   s/p edvin,   h/o EtOH abuse   tobacco dependence          PLAN    on return to icu pt started on phenobarb load 260mg IV on 5/9   precedex d/c'd  ciwa protocol,   folic acid, thiamine, multivitamin,   soc wk f/u   family interested in etoh rehab but patient would need to agree to receive inpatient substance use treatment prior to SW making an active referral   pt declining to go to inpatient rehab but ok with info for outpt support group  ct abd pelv with Acute pancreatitis. Hepatic steatosis noted    serum creat wnl   potassium wnl  cont protonix    cont current meds   mgmt as per icu

## 2024-07-30 NOTE — ED PROVIDER NOTE - CPE EDP NEURO NORM
Patient: Taurus Perez    Procedure Summary       Date: 07/30/24 Room / Location: [facility] [place]    Anesthesia Start: 1059 Anesthesia Stop:     Procedure: EGD Diagnosis:       Non-Hodgkin lymphoma, unspecified, unspecified site (Multi)      Other diseases of stomach and duodenum      Non-Hodgkin lymphoma, unspecified, extranodal and solid organ sites (Multi)      Non-Hodgkin lymphoma, unspecified, unspecified site (Multi)    Scheduled Providers: Raissa Lucero MD Responsible Provider: Lety Cedeno MD    Anesthesia Type: MAC ASA Status: 3            Anesthesia Type: MAC    Vitals Value Taken Time   /56 07/30/24 1140   Temp 36 07/30/24 1140   Pulse 68 07/30/24 1140   Resp 10 07/30/24 1140   SpO2 96 07/30/24 1140       Anesthesia Post Evaluation    Patient location during evaluation: bedside  Patient participation: complete - patient participated  Level of consciousness: awake and alert  Pain score: 0  Pain management: adequate  Airway patency: patent  Cardiovascular status: acceptable  Respiratory status: acceptable  Hydration status: acceptable  Postoperative Nausea and Vomiting: none        There were no known notable events for this encounter.    
normal...

## 2025-04-23 NOTE — PATIENT PROFILE ADULT - RELATIONSHIP TO PATIENT
- The patient does not need to establish primary care or schedule routine visits at our facility.  - The patient wants keep all medical care under Dr. Tomas Greene, except for any future surgical procedures, which he wants performed within the [facility].  - He would like any major surgery to take place at our facility; Dr. Greene will manage all preoperative evaluations and referrals.  - It was discussed if an internist is needed within [facility] for coordination, he can contact Dr. Demarco, in collaboration with Dr. Greene.     cousin

## 2025-05-21 PROBLEM — Z00.00 ENCOUNTER FOR PREVENTIVE HEALTH EXAMINATION: Status: ACTIVE | Noted: 2025-05-21

## 2025-05-22 ENCOUNTER — APPOINTMENT (OUTPATIENT)
Dept: ORTHOPEDIC SURGERY | Facility: CLINIC | Age: 45
End: 2025-05-22